# Patient Record
Sex: FEMALE | HISPANIC OR LATINO | Employment: FULL TIME | URBAN - NONMETROPOLITAN AREA
[De-identification: names, ages, dates, MRNs, and addresses within clinical notes are randomized per-mention and may not be internally consistent; named-entity substitution may affect disease eponyms.]

---

## 2023-05-31 ENCOUNTER — OFFICE VISIT (OUTPATIENT)
Dept: OBGYN CLINIC | Facility: CLINIC | Age: 43
End: 2023-05-31
Payer: OTHER MISCELLANEOUS

## 2023-05-31 VITALS
HEART RATE: 61 BPM | SYSTOLIC BLOOD PRESSURE: 138 MMHG | BODY MASS INDEX: 25.32 KG/M2 | DIASTOLIC BLOOD PRESSURE: 80 MMHG | TEMPERATURE: 97.5 F | HEIGHT: 60 IN | WEIGHT: 129 LBS

## 2023-05-31 DIAGNOSIS — G89.29 CHRONIC RIGHT SHOULDER PAIN: ICD-10-CM

## 2023-05-31 DIAGNOSIS — S46.911A STRAIN OF RIGHT SHOULDER, INITIAL ENCOUNTER: ICD-10-CM

## 2023-05-31 DIAGNOSIS — M25.511 CHRONIC RIGHT SHOULDER PAIN: ICD-10-CM

## 2023-05-31 DIAGNOSIS — M54.2 CERVICALGIA: ICD-10-CM

## 2023-05-31 DIAGNOSIS — M67.819 TENDINOSIS OF ROTATOR CUFF: ICD-10-CM

## 2023-05-31 DIAGNOSIS — M54.2 NECK PAIN: Primary | ICD-10-CM

## 2023-05-31 PROCEDURE — 99204 OFFICE O/P NEW MOD 45 MIN: CPT | Performed by: STUDENT IN AN ORGANIZED HEALTH CARE EDUCATION/TRAINING PROGRAM

## 2023-05-31 RX ORDER — ACETAMINOPHEN 325 MG/1
650 TABLET ORAL EVERY 6 HOURS PRN
COMMUNITY

## 2023-05-31 RX ORDER — LISINOPRIL 20 MG/1
20 TABLET ORAL DAILY
COMMUNITY

## 2023-05-31 NOTE — LETTER
May 31, 2023     Patient: Michelle Medina  YOB: 1980  Date of Visit: 5/31/2023      To Whom it May Concern:    Michelle Medina is under my professional care  Ana Raymundo was seen in my office on 5/31/2023  Patient to be scheduled for an ultrasound guided injection cortisone injection of her right shoulder as soon as possible  Patient recommended to continue formal physical therapy 2x/week to facilitate recovery and work hardening  Restricted to lifting/pushing/pulling no more than 10 pounds with her right arm until cleared by a physician  Plan on follow up in 1 week for ultrasound guided injection  If you have any questions or concerns, please don't hesitate to call           Sincerely,          Monie Cervantes MD        CC: No Recipients

## 2023-05-31 NOTE — PROGRESS NOTES
1  Neck pain  Ambulatory Referral to Physical Therapy      2  Cervicalgia  Ambulatory Referral to Physical Therapy      3  Chronic right shoulder pain  Ambulatory Referral to Physical Therapy      4  Tendinosis of rotator cuff  Ambulatory Referral to Physical Therapy      5  Strain of right shoulder, initial encounter  Ambulatory Referral to Physical Therapy        Orders Placed This Encounter   Procedures   • Ambulatory Referral to Physical Therapy        Imaging Studies (I personally reviewed images in PACS and report):    • MRI cervical spine with and without contrast 5/10/2023:  • MRI right shoulder without contrast 4/12/2023:                  • MRI cervical spine with and without contrast 10/28/2017: via [facility] (no images)  MR findings: There is straightening of the normal cervical lordosis, a stable finding  The vertebral bodies are normal in height and signal characteristics  Degenerative disc space narrowing is present at C4-5 through C6-7  The signal intensity within the cervical cord is normal and there is no pathologic cord enhancement  At C2-3, there is no significant abnormality  At C3-4, there is minor bulging of the anulus fibrosus  Left-sided uncovertebral spurring and facet arthropathy is present with moderate neural foraminal narrowing  At C4-5, there is mild bulging of the anulus fibrosus associated with the posterior annular fissure  This partially effaces the anterior CSF space  There is no significant central stenosis or neural foraminal stenosis  There is no cord compression  At C5-6, there is bulging of the anulus fibrosus with very slight flattening of the ventral cord  Uncovertebral spurring is present with minor neural foraminal narrowing  At C6-7, there is minor bulging of the anulus fibrosus which partially effaces the anterior CSF space  At C7-T1, there is no significant abnormality       The paraspinal soft tissues are "unremarkable  IMPRESSION:  • Chronic right shoulder   • Chronic neck pain/cervicalgia  • S/p work injury - reports pulling four carts of heavy totes and felt a sudden straining sensation and immediate pain with limited range of motion of her shoulder  • Pain originally was in the shoulder and is progressively radiated to the dorsal aspect of her shoulder and right paracervical aspect of her neck  • Reported paresthesias of her right hand as well but neurologically intact on clinical exam  However, she notes that her prior CSI which provided brief relief alleviate these symptoms as well which is an atypical response  • Reportedly briefly improved for about 4 days from a palpation guided \"shoulder injection\" about a month ago  Limited relief from attending formal PT ([facility])  • MRI noting tendinitis of right supraspinatus  MRI of c-spine noting mild disc bulging/spondylosis  Clinically aggravated when testing rotator cuff/biceps as per exam below    Date of Injury: 3/6/2023      Workmen's Compensation initial evaluation    Other factors:  • History of chronic headache disorder  • Cannot tolerate oral NSAIDs due to allergy    PLAN:    • Clinical exam and radiographic imaging reviewed with patient today, with impression as per above  I have discussed with the patient the pathophysiology of this diagnosis and reviewed how the examination correlates with this diagnosis  • Patient has MRIs of her cervical spine as well as her right shoulder uploaded today      • Given her reported brief/lack of response to a palpation guided CSI of her right shoulder and no surgical intervention warranted based on MRIs, plan for ultrasound-guided right subacromial cortisone injection as well as a bicipital tendon sheath cortisone injection based on her clinical exam today  • Recommended to continue formal physical therapy 2 times a week to help facilitate recovery and work hardening  • Based on the findings of her MRI, I would " "recommend continued conservative treatment at this time  If she does not respond to cortisone injections as well as continued physical therapy, may need to get a second opinion from orthopedic surgery as well as pain management  • Work accommodations note provided today as per communications  • Patient reports already using a TENS machine unit and I recommended continued use 3 times a day for 15-minute sessions  She can continue use of acetaminophen 500-1000 mg every 6-8 hours as needed  Patient is unable to tolerate NSAIDs due to an allergy  Return in about 1 week (around 6/7/2023) for Schedule USG injection of right shoulder (30 minutes)  Portions of the record may have been created with voice recognition software  Occasional wrong word or \"sound a like\" substitutions may have occurred due to the inherent limitations of voice recognition software  Read the chart carefully and recognize, using context, where substitutions have occurred  I have spent a total time of 45 minutes on 06/05/23 in caring for this patient including Diagnostic results, Prognosis, Risks and benefits of tx options, Instructions for management, Patient and family education, Importance of tx compliance, Risk factor reductions, Impressions, Counseling / Coordination of care, Documenting in the medical record, Reviewing / ordering tests, medicine, procedures   and Obtaining or reviewing history    CHIEF COMPLAINT:  Chief Complaint   Patient presents with   • Neck - Pain   Right shoulder pain      HPI:  Anuj Mahoney is a 37 y o  female  who presents for       Visit 5/31/2023:  Evaluation of neck pain/injury  Workmen's Compensation initial evaluation  Precipitating injury on 3/6/2023 - reports pulling four totes of material and feeling and sudden/straining sensation of her right shoulder and right neck  She also reports paresthesias of her right hand intermittently as well    Since incident, she reports treatments included are " formal PT with [facility] (cannot confirm visit on chart review today), a right shoulder injection (cannot confirm what was performed) about a month ago in which she states sustaining about 4-5 days of relief completely before pain slowly recurred; she states paresthesias of her hand also resolved during this time  She continues to be on work accommodations as she feels she cannot increase her demands without aggravating right shoulder pain  Describes pain as sharp/aching/throbbing and of moderate intensity  Pain aggravated when reaching above shoulder height/behind her back as well as lifting/pushing/pulling and lying on her right shoulder  In regards to pain control currently, she reports she cannot take NSAIDs due to an allergy  She has been using acetaminophens, ice/heat, TENS unit with minimal relief  Denies prior surgeries of right shoulder in the past         Medical, Surgical, Family, and Social History    Past Medical History:   Diagnosis Date   • Hypertension      History reviewed  No pertinent surgical history  Social History   Social History     Substance and Sexual Activity   Alcohol Use Never     Social History     Substance and Sexual Activity   Drug Use Never     Social History     Tobacco Use   Smoking Status Never   Smokeless Tobacco Never     History reviewed  No pertinent family history  Allergies   Allergen Reactions   • Corylus Itching   • Proanthocyanidin Swelling   • Shrimp Extract Allergy Skin Test - Food Allergy Hives   • Apple Allergy Skin Test - Food Allergy Facial Swelling   • Ibuprofen Swelling   • Penicillins Edema          Physical Exam  /80 (BP Location: Left arm)   Pulse 61   Temp 97 5 °F (36 4 °C) (Temporal)   Ht 5' (1 524 m)   Wt 58 5 kg (129 lb)   BMI 25 19 kg/m²     Constitutional:  see vital signs  Gen: well-developed, normocephalic/atraumatic, well-groomed  Pulmonary/Chest: Effort normal  No respiratory distress         Ortho Exam  Cervical  ROM: intact  Midline spinous process tenderness: None  Muscular Tenderness: +right paracervical  Sensation UE Bilateral:  C5: normal  C6: decreased on right compared to left  C7: decreased on right compared to left  C8: decreased on right compared to left  T1: normal  Strength UE: 5/5 elbow, wrist, fingers bilateral   Spurlings: negative b/l    OK sign: intact  Thumb extension: 5/5  Digit abduction: intact and symmetrical w/ contralateral side     Shoulder exam:       RIGHT    Inspection Erythema None     Swelling None     Increased Warmth None    Rotator cuff  ER 5-/5     IR 5/5     Abduction 5-/5    ROM      Abduction 130     IR90 40     IRb Lower thoracic    TTP:  +anterior aspect over bicipital groove, lateral aspect over greater tuberosity    Special Tests: O'Briens  (FF 90, add 10, resist thumbs up-, resist thumbs down+) Negative slap    Cross body Adduction Negative     Speeds  Positive     Yergason's Positive     Drop arm Negative     Neer Positive     Falcon Positive            Procedures

## 2023-07-12 ENCOUNTER — OFFICE VISIT (OUTPATIENT)
Dept: OBGYN CLINIC | Facility: CLINIC | Age: 43
End: 2023-07-12
Payer: OTHER MISCELLANEOUS

## 2023-07-12 VITALS
HEART RATE: 65 BPM | WEIGHT: 129 LBS | HEIGHT: 60 IN | TEMPERATURE: 97.7 F | DIASTOLIC BLOOD PRESSURE: 60 MMHG | BODY MASS INDEX: 25.32 KG/M2 | SYSTOLIC BLOOD PRESSURE: 100 MMHG

## 2023-07-12 DIAGNOSIS — M54.2 NECK PAIN: ICD-10-CM

## 2023-07-12 DIAGNOSIS — M67.819 TENDINOSIS OF ROTATOR CUFF: ICD-10-CM

## 2023-07-12 DIAGNOSIS — S46.911A STRAIN OF RIGHT SHOULDER, INITIAL ENCOUNTER: ICD-10-CM

## 2023-07-12 DIAGNOSIS — G89.29 CHRONIC RIGHT SHOULDER PAIN: Primary | ICD-10-CM

## 2023-07-12 DIAGNOSIS — M54.2 CERVICALGIA: ICD-10-CM

## 2023-07-12 DIAGNOSIS — M75.21 BICIPITAL TENDINITIS OF RIGHT SHOULDER: ICD-10-CM

## 2023-07-12 DIAGNOSIS — M25.511 CHRONIC RIGHT SHOULDER PAIN: Primary | ICD-10-CM

## 2023-07-12 PROCEDURE — 76942 ECHO GUIDE FOR BIOPSY: CPT | Performed by: STUDENT IN AN ORGANIZED HEALTH CARE EDUCATION/TRAINING PROGRAM

## 2023-07-12 PROCEDURE — 20611 DRAIN/INJ JOINT/BURSA W/US: CPT | Performed by: STUDENT IN AN ORGANIZED HEALTH CARE EDUCATION/TRAINING PROGRAM

## 2023-07-12 PROCEDURE — 20550 NJX 1 TENDON SHEATH/LIGAMENT: CPT | Performed by: STUDENT IN AN ORGANIZED HEALTH CARE EDUCATION/TRAINING PROGRAM

## 2023-07-12 PROCEDURE — S0020 INJECTION, BUPIVICAINE HYDRO: HCPCS | Performed by: STUDENT IN AN ORGANIZED HEALTH CARE EDUCATION/TRAINING PROGRAM

## 2023-07-12 RX ORDER — BUPIVACAINE HYDROCHLORIDE 2.5 MG/ML
2 INJECTION, SOLUTION INFILTRATION; PERINEURAL
Status: COMPLETED | OUTPATIENT
Start: 2023-07-12 | End: 2023-07-12

## 2023-07-12 RX ORDER — BETAMETHASONE SODIUM PHOSPHATE AND BETAMETHASONE ACETATE 3; 3 MG/ML; MG/ML
3 INJECTION, SUSPENSION INTRA-ARTICULAR; INTRALESIONAL; INTRAMUSCULAR; SOFT TISSUE
Status: COMPLETED | OUTPATIENT
Start: 2023-07-12 | End: 2023-07-12

## 2023-07-12 RX ORDER — BUPIVACAINE HYDROCHLORIDE 2.5 MG/ML
0.5 INJECTION, SOLUTION INFILTRATION; PERINEURAL
Status: COMPLETED | OUTPATIENT
Start: 2023-07-12 | End: 2023-07-12

## 2023-07-12 RX ORDER — TRIAMCINOLONE ACETONIDE 40 MG/ML
40 INJECTION, SUSPENSION INTRA-ARTICULAR; INTRAMUSCULAR
Status: COMPLETED | OUTPATIENT
Start: 2023-07-12 | End: 2023-07-12

## 2023-07-12 RX ADMIN — BUPIVACAINE HYDROCHLORIDE 2 ML: 2.5 INJECTION, SOLUTION INFILTRATION; PERINEURAL at 12:15

## 2023-07-12 RX ADMIN — BUPIVACAINE HYDROCHLORIDE 0.5 ML: 2.5 INJECTION, SOLUTION INFILTRATION; PERINEURAL at 12:15

## 2023-07-12 RX ADMIN — TRIAMCINOLONE ACETONIDE 40 MG: 40 INJECTION, SUSPENSION INTRA-ARTICULAR; INTRAMUSCULAR at 12:15

## 2023-07-12 RX ADMIN — BETAMETHASONE SODIUM PHOSPHATE AND BETAMETHASONE ACETATE 3 MG: 3; 3 INJECTION, SUSPENSION INTRA-ARTICULAR; INTRALESIONAL; INTRAMUSCULAR; SOFT TISSUE at 12:15

## 2023-07-12 NOTE — PROGRESS NOTES
Hand/upper extremity injection  Universal Protocol:  Consent: Verbal consent not obtained. Risks and benefits: risks, benefits and alternatives were discussed  Consent given by: patient  Patient understanding: patient states understanding of the procedure being performed  Site marked: the operative site was marked  Radiology Images displayed and confirmed. If images not available, report reviewed: imaging studies available  Required items: required blood products, implants, devices, and special equipment available  Patient identity confirmed: verbally with patient    Supporting Documentation  Indications: joint swelling, pain, diagnostic and therapeutic   Procedure Details  Condition:tendonitis Condition comment: Right bicipital tendonitis   Preparation: Patient was prepped and draped in the usual sterile fashion  Needle size: 25 G  Ultrasound guidance: yes  Approach: lateral  Medications administered: 3 mg betamethasone acetate-betamethasone sodium phosphate 6 (3-3) mg/mL; 0.5 mL bupivacaine 0.25 %  Analysis: fluid sample sent for laboratory analysis  Patient tolerance: patient tolerated the procedure well with no immediate complications    RIGHT Ultrasound-guided long head of the biceps tendon injection performed with sterile field, gloves, probe cover using anesthetic and corticosteroid with 25 gauge needle advanced to target site in line with linear array probe short axis to biceps tendon in bicipital groove. Anterior circumflex artery visualized and avoided during injection. Injectate visualized filling tendinous sheath. Sterile bandage placed. Minimal to no bleeding. Post-procedure, counseled no lifting more than 1 pound with her right arm for the next 3 days in order to minimize risk for bicipital tendon rupture. After 3 days, she can return to her restricted lifting duties with her right arm. Work accommodations note provided today.   I also placed a new referral to physical therapy to restart post injections. I will follow-up with her in 6 weeks for reevaluation. Large joint arthrocentesis: R subacromial bursa  Universal Protocol:  Consent: Verbal consent obtained. Risks and benefits: risks, benefits and alternatives were discussed  Consent given by: patient  Patient understanding: patient states understanding of the procedure being performed  Site marked: the operative site was marked  Radiology Images displayed and confirmed.  If images not available, report reviewed: imaging studies available  Required items: required blood products, implants, devices, and special equipment available  Patient identity confirmed: verbally with patient    Supporting Documentation  Indications: pain and diagnostic evaluation   Procedure Details  Location: shoulder - R subacromial bursa  Preparation: Patient was prepped and draped in the usual sterile fashion  Needle size: 22 G  Ultrasound guidance: yes  Approach: posterior  Medications administered: 40 mg triamcinolone acetonide 40 mg/mL; 2 mL bupivacaine 0.25 %    Patient tolerance: patient tolerated the procedure well with no immediate complications  Dressing:  Sterile dressing applied

## 2023-07-12 NOTE — LETTER
July 12, 2023     Patient: Papo Garay  YOB: 1980  Date of Visit: 7/12/2023      To Whom it May Concern:    Papo Mendesnt is under my professional care. Federico Elizabeth was seen in my office on 7/12/2023. Patient underwent ultrasound guided injections of her right shoulder today. Recommend no lifting/pushing/pulling more than 1 pound with right arm for three days. On 7/16/2023, she is restricted from lifting/pushing/pulling no more than 5 pounds with her right arm until cleared by a physician. Patient recommended to continue formal physical therapy 2x/week to facilitate recovery and work hardening for the next 6 weeks. Follow up in 6 weeks. If you have any questions or concerns, please don't hesitate to call.          Sincerely,          Janay Cole MD        CC: No Recipients

## 2023-07-12 NOTE — PATIENT INSTRUCTIONS
CORTICOSTEROID INJECTION  What is a corticosteroid? Injuries or disease such as arthritis, bursitis or tendonitis result in inflammation. In turn, this inflammation can cause swelling and pain. A local injection of a corticosteroid is provided to diminish inflammation. By doing so, it will also decrease pain and swelling which is making you uncomfortable. Is this the same thing as a Cortisone Injection? Cortisone® is a brand name of a corticosteroid used commonly in the past.  Today I commonly use a more water-soluble corticosteroid named Celestone®. Will the injection hurt? As with any injection, you may feel pain at the time of the injection. Typically, I use a local anesthetic (Anneliese Freda) in addition to the corticosteroid to determine if the injection has been placed in the appropriate location. Hence it is important to monitor your symptoms 4-6 hours after the injection, as the area will be anesthetized (numb) while the local anesthetic is working. Once the local anesthetic wears off, the intensity of pain can be the same as it was prior to the injection, or even worse. This does not mean that the injection is not working. The corticosteroid may take 24-72 hours to begin having a positive effect. If you do experience an increase in pain, the use of an ice pack on the area for 20 minutes at a time should help. It is also helpful to take an oral anti-inflammatory such as Tylenol® or Motrin® if you are able to medically do so. For this reason it is best to avoid activities that put stress on the area the first 24 hours after the injection. How long will pain relief last?  This will vary according to the type and severity of the symptoms being treated and the severity of the condition. Symptom relief may last weeks to months. I typically couple injections with physical therapy so that the underlying problem causing the inflammation may be treated as the pain diminishes.   If the combination is not successful, you may be a surgical candidate. I have read bad things about steroids. Will these things happen to me? Corticosteroids, when utilized properly, are safe and effective drugs. When used in a low dose, potential adverse reactions are very rare. Some patients may experience a sensation of flushing for several days. Some can experience feelings of agitation. Others may have depigmentation and dimpling of the overlying skin at the site of the injection. Very rarely, there can be a local reaction which may include increased discomfort for a period of time in the areas that has been injected. A steroid should not be used over and over again. Multiple injections in the same area can produce adverse effects such as tissue atrophy and degeneration of tendon or cartilage. A small percentage of patients (< 0.1%) may develop an infection in the joint after injection. This is a treatable problem, but if neglected, may result in permanent disability. Signs of infection include redness, swelling, discharge, fevers/chills, increasing pain and drainage from the injection site. This represents an emergency and you should contact our office immediately or seek treatment in the ER if after hours. If I have diabetes, will this injection affect me? If you are diabetic, an injection of a corticosteroid can raise your blood sugar level, requiring more insulin for a brief period of time. This may necessitate careful blood sugar maintenance. If the elevated sugars are not able to be controlled, contact your diabetic doctor for guidance.

## 2023-08-23 ENCOUNTER — OFFICE VISIT (OUTPATIENT)
Dept: OBGYN CLINIC | Facility: CLINIC | Age: 43
End: 2023-08-23
Payer: OTHER MISCELLANEOUS

## 2023-08-23 VITALS
HEIGHT: 60 IN | OXYGEN SATURATION: 97 % | WEIGHT: 127.8 LBS | HEART RATE: 71 BPM | SYSTOLIC BLOOD PRESSURE: 110 MMHG | DIASTOLIC BLOOD PRESSURE: 70 MMHG | BODY MASS INDEX: 25.09 KG/M2 | TEMPERATURE: 98 F

## 2023-08-23 DIAGNOSIS — S46.911D STRAIN OF RIGHT SHOULDER, SUBSEQUENT ENCOUNTER: ICD-10-CM

## 2023-08-23 DIAGNOSIS — M75.21 BICIPITAL TENDINITIS OF RIGHT SHOULDER: ICD-10-CM

## 2023-08-23 DIAGNOSIS — M54.2 NECK PAIN: ICD-10-CM

## 2023-08-23 DIAGNOSIS — G89.29 CHRONIC RIGHT SHOULDER PAIN: Primary | ICD-10-CM

## 2023-08-23 DIAGNOSIS — M67.819 TENDINOSIS OF ROTATOR CUFF: ICD-10-CM

## 2023-08-23 DIAGNOSIS — M54.2 CERVICALGIA: ICD-10-CM

## 2023-08-23 DIAGNOSIS — M54.12 CERVICAL RADICULAR PAIN: ICD-10-CM

## 2023-08-23 DIAGNOSIS — M25.511 CHRONIC RIGHT SHOULDER PAIN: Primary | ICD-10-CM

## 2023-08-23 PROCEDURE — 99213 OFFICE O/P EST LOW 20 MIN: CPT | Performed by: STUDENT IN AN ORGANIZED HEALTH CARE EDUCATION/TRAINING PROGRAM

## 2023-08-23 NOTE — LETTER
August 23, 2023     Patient: Johnny Hung  YOB: 1980  Date of Visit: 8/23/2023      To Whom it May Concern:    Johnny Hung is under my professional care. Shamika Mari was seen in my office on 8/23/2023. Patient reports no improvement of symptoms. However, she notes that she has not restarted physical therapy since our last appointment which can limit recovery. I have placed a new physical therapy referral and I recommend she continue 2x/week. Restricted to lifting/pushing/pulling no more than 10 pounds with her right arm until cleared by a physician. Restricted from repetitive motions with right upper extremity. She will be additionally referred today to orthopedic surgery and also spine & pain management for further second opinions on treatment options. If you have any questions or concerns, please don't hesitate to call.          Sincerely,          Monet Allison MD        CC: No Recipients

## 2023-08-23 NOTE — PROGRESS NOTES
1. Chronic right shoulder pain  Ambulatory Referral to Physical Therapy    Ambulatory Referral to Orthopedic Surgery      2. Tendinosis of rotator cuff  Ambulatory Referral to Physical Therapy    Ambulatory Referral to Orthopedic Surgery      3. Strain of right shoulder, subsequent encounter  Ambulatory Referral to Physical Therapy      4. Bicipital tendinitis of right shoulder  Ambulatory Referral to Physical Therapy      5. Cervicalgia  Ambulatory Referral to Physical Therapy    Ambulatory referral to Spine & Pain Management      6. Neck pain  Ambulatory Referral to Physical Therapy    Ambulatory referral to Spine & Pain Management      7. Cervical radicular pain  Ambulatory referral to Spine & Pain Management        Orders Placed This Encounter   Procedures   • Ambulatory Referral to Physical Therapy   • Ambulatory Referral to Orthopedic Surgery   • Ambulatory referral to Spine & Pain Management        Imaging Studies (I personally reviewed images in PACS and report):    • MRI cervical spine with and without contrast 5/10/2023:  • MRI right shoulder without contrast 4/12/2023:                  • MRI cervical spine with and without contrast 10/28/2017: via [employer] (no images)  MR findings: There is straightening of the normal cervical lordosis, a stable finding. The vertebral bodies are normal in height and signal characteristics. Degenerative disc space narrowing is present at C4-5 through C6-7. The signal intensity within the cervical cord is normal and there is no pathologic cord enhancement. At C2-3, there is no significant abnormality. At C3-4, there is minor bulging of the anulus fibrosus. Left-sided uncovertebral spurring and facet arthropathy is present with moderate neural foraminal narrowing. At C4-5, there is mild bulging of the anulus fibrosus associated with the posterior annular fissure. This partially effaces the anterior CSF space.  There is no significant central stenosis or neural foraminal stenosis. There is no cord compression. At C5-6, there is bulging of the anulus fibrosus with very slight flattening of the ventral cord. Uncovertebral spurring is present with minor neural foraminal narrowing. At C6-7, there is minor bulging of the anulus fibrosus which partially effaces the anterior CSF space. At C7-T1, there is no significant abnormality. The paraspinal soft tissues are unremarkable. IMPRESSION:  • Chronic right shoulder   • Chronic neck pain/cervicalgia  • S/p work injury - reports pulling four carts of heavy totes and felt a sudden straining sensation and immediate pain with limited range of motion of her shoulder  • Pain originally was in the shoulder and is progressively radiated to the dorsal aspect of her shoulder and right paracervical aspect of her neck  • Reported paresthesias of her right hand as well but neurologically intact on clinical exam. However, she notes that her prior CSI which provided brief relief alleviate these symptoms as well which is an atypical response  • Reportedly briefly improved for about 4 days from a palpation guided "shoulder injection" about a month ago. Limited relief from attending formal PT ([facility])  • MRI noting tendinitis of right supraspinatus. MRI of c-spine noting mild disc bulging/spondylosis. Clinically aggravated when testing rotator cuff/biceps as per exam below  • S/p USG right subacromial CSI and bicipital tendon sheath CSI on 7/12/2023 -patient reports minimal to no improvement of her pain since last visit. However she reportedly did not restart physical therapy after our last appointment    Date of Injury: 3/6/2023      Workmen's Compensation initial evaluation    Other factors:  • History of chronic headache disorder  • Cannot tolerate oral NSAIDs due to allergy    PLAN:    • Clinical exam and radiographic imaging reviewed with patient today, with impression as per above.  I have discussed with the patient the pathophysiology of this diagnosis and reviewed how the examination correlates with this diagnosis. • Given she has not had any significant improvement since her ultrasound-guided injections, I have referred her to orthopedic surgery as well as pain management for second opinions. • I have placed a new order for physical therapy going forward to help facilitate recovery as well as work hardening  • I have placed a new work note today with accommodations. • Patient to continue as needed use of acetaminophen 500-1000 mg Q 6-8 hours as needed, heat/ice therapy torments on/off, TENS machine use as needed for pain control. • I can follow-up with patient after her second opinion. If no surgical intervention is recommended and pain management is no other recommended treatment modalities, I may need to consider referring patient for a functional capacity exam.    Return for Follow up with pain management and orthopedic surgery for second opinions. Portions of the record may have been created with voice recognition software. Occasional wrong word or "sound a like" substitutions may have occurred due to the inherent limitations of voice recognition software. Read the chart carefully and recognize, using context, where substitutions have occurred. CHIEF COMPLAINT:  Chief Complaint   Patient presents with   • Right Shoulder - Pain   Neck pain      HPI:  Kota Cavazos is a 37 y.o. female  who presents for     Visit 8/23/2023: Follow-up evaluation of neck pain/right shoulder pain/injury  Workmen's Compensation follow-up evaluation  Patient last seen previously regards to having an ultrasound-guided subacromial cortisone injection as well as a bicipital tendon sheath cortisone injection-patient reports that she briefly had minimal improvement of the intensity of pain of her right shoulder before progressively recurred again.   She was also recommended to continue formal physical therapy to work on facilitating recovery/work hardening. I have not been receiving any notes from physical therapy -patient states that she has not restarted physical therapy at [facility] since her last appointment. Continues report pain mainly over the anterior and lateral aspects of her shoulder that can radiate up to her neck as well as down to her right hand. She describes as a sharp/shooting pain that is aggravated with any repetitive range of motion movements as well as reaching above shoulder height and behind her back. She reports a sense of fatigue and weakness of her right arm compared to her contralateral side. Denies shoulder swelling. Reports intermittent crepitus of her shoulder with range of motion movements. Denies any new injury since last visit. 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 Visit 5/31/2023:  Evaluation of neck pain/injury  Workmen's Compensation initial evaluation  Precipitating injury on 3/6/2023 - reports pulling four totes of material and feeling and sudden/straining sensation of her right shoulder and right neck. She also reports paresthesias of her right hand intermittently as well. Since incident, she reports treatments included are formal PT with [facility] (cannot confirm visit on chart review today), a right shoulder injection (cannot confirm what was performed) about a month ago in which she states sustaining about 4-5 days of relief completely before pain slowly recurred; she states paresthesias of her hand also resolved during this time. She continues to be on work accommodations as she feels she cannot increase her demands without aggravating right shoulder pain  Describes pain as sharp/aching/throbbing and of moderate intensity.  Pain aggravated when reaching above shoulder height/behind her back as well as lifting/pushing/pulling and lying on her right shoulder  In regards to pain control currently, she reports she cannot take NSAIDs due to an allergy. She has been using acetaminophens, ice/heat, TENS unit with minimal relief. Denies prior surgeries of right shoulder in the past.        Medical, Surgical, Family, and Social History    Past Medical History:   Diagnosis Date   • Hypertension      Past Surgical History:   Procedure Laterality Date   • LAPAROSCOPY      endometriosis     Social History   Social History     Substance and Sexual Activity   Alcohol Use Never     Social History     Substance and Sexual Activity   Drug Use Never     Social History     Tobacco Use   Smoking Status Never   Smokeless Tobacco Never     History reviewed. No pertinent family history. Allergies   Allergen Reactions   • Corylus Itching   • Proanthocyanidin Swelling   • Shrimp Extract Allergy Skin Test - Food Allergy Hives   • Apple Allergy Skin Test - Food Allergy Facial Swelling   • Ibuprofen Swelling   • Penicillins Edema          Physical Exam  /70   Pulse 71   Temp 98 °F (36.7 °C)   Ht 5' (1.524 m)   Wt 58 kg (127 lb 12.8 oz)   SpO2 97%   BMI 24.96 kg/m²     Constitutional:  see vital signs  Gen: well-developed, normocephalic/atraumatic, well-groomed  Pulmonary/Chest: Effort normal. No respiratory distress.        Ortho Exam  Cervical  ROM: intact in all planes of motion but patient reports aggravated midline paracervical neck pain while performing  Midline spinous process tenderness: C5, C6, C7+  Muscular Tenderness: +right paracervical  Sensation UE Bilateral:  C5: normal  C6: Normal  C7: Normal  C8: normal   T1: normal  Strength UE: 5/5 elbow, wrist, fingers bilateral   Spurlings: negative b/l    OK sign: intact  Thumb extension: 5/5  Digit abduction: intact and symmetrical w/ contralateral side     Shoulder exam:       RIGHT    Inspection Erythema None     Swelling None     Increased Warmth None Rotator cuff  ER 5/5     IR 5/5     Abduction 5/5    ROM      Abduction 90 actively, 140 passively     IR90 40     IRb Lower thoracic    TTP:  + Anteriorly over glenohumeral joint, laterally over the greater tuberosity, right paracervical/trapezius    Special Tests: O'Briens  (FF 90, add 10, resist thumbs up-, resist thumbs down+) Negative slap    Cross body Adduction Negative     Speeds  Positive     Yergason's Positive     Drop arm Negative     Neer Positive     Falcon Positive            Procedures

## 2023-09-14 ENCOUNTER — TELEPHONE (OUTPATIENT)
Dept: OBGYN CLINIC | Facility: CLINIC | Age: 43
End: 2023-09-14

## 2023-09-14 NOTE — TELEPHONE ENCOUNTER
----- Message from Stefania Jiménez MD sent at 9/14/2023  9:39 AM EDT -----  Awa Recinos,    I updated her work letter in her communications with the dates of her ortho/pain mgmt follow ups. I did not make a specific expiration date as she has not seen orthopedic surgery or pain management yet. I have also not received any documents from physical therapy that she reportedly has been attending; can you see where she went to reach out to their office and have them fax over how long she has been seeing them (I think she said [facility]).       ----- Message -----  From: Malachi Howell  Sent: 9/14/2023   9:11 AM EDT  To: Stefania Jiménez MD    Patient stopped by office this morning asking for new work note. Has appointments set up for second opinion 9/27/23 with ortho. But needs a new work for the time being. Note needs an expiration date of treatment saying that treatment is not over? Employer is giving a hard time since there was no specific date on original note from the last office visit on 8/23/23. Would like a callback.      Callback #: [phone]
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
## 2023-09-21 ENCOUNTER — EVALUATION (OUTPATIENT)
Age: 43
End: 2023-09-21
Payer: OTHER MISCELLANEOUS

## 2023-09-21 DIAGNOSIS — M67.819 TENDINOSIS OF ROTATOR CUFF: ICD-10-CM

## 2023-09-21 DIAGNOSIS — S46.911D STRAIN OF RIGHT SHOULDER, SUBSEQUENT ENCOUNTER: ICD-10-CM

## 2023-09-21 DIAGNOSIS — G89.29 CHRONIC RIGHT SHOULDER PAIN: ICD-10-CM

## 2023-09-21 DIAGNOSIS — M54.2 CERVICALGIA: ICD-10-CM

## 2023-09-21 DIAGNOSIS — M75.21 BICIPITAL TENDINITIS OF RIGHT SHOULDER: ICD-10-CM

## 2023-09-21 DIAGNOSIS — M54.2 NECK PAIN: ICD-10-CM

## 2023-09-21 DIAGNOSIS — M25.511 CHRONIC RIGHT SHOULDER PAIN: ICD-10-CM

## 2023-09-21 PROCEDURE — 97161 PT EVAL LOW COMPLEX 20 MIN: CPT | Performed by: PHYSICAL THERAPIST

## 2023-09-21 PROCEDURE — 97110 THERAPEUTIC EXERCISES: CPT | Performed by: PHYSICAL THERAPIST

## 2023-09-21 NOTE — PROGRESS NOTES
PT Evaluation     Today's date: 2023  Patient name: Tricia Guzman  : 1980  MRN: [id number]  Referring provider: Quin Parry MD  Dx:   Encounter Diagnosis     ICD-10-CM    1. Chronic right shoulder pain  M25.511 Ambulatory Referral to Physical Therapy    G89.29       2. Tendinosis of rotator cuff  M67.819 Ambulatory Referral to Physical Therapy      3. Strain of right shoulder, subsequent encounter  S46.911D Ambulatory Referral to Physical Therapy      4. Bicipital tendinitis of right shoulder  M75.21 Ambulatory Referral to Physical Therapy      5. Cervicalgia  M54.2 Ambulatory Referral to Physical Therapy      6. Neck pain  M54.2 Ambulatory Referral to Physical Therapy          Start Time: 1050  Stop Time: 1145  Total time in clinic (min): 55 minutes    Assessment  Assessment details: Tricia Guzman presents to PT with 6mth Hx of R shoulder pain that started when pulling a crate at work. Significant findings from examination include: decreased/painful Shoulder ROM, Painful/weak MMT, parascapular mm spasm, (+) shoulder impingement, and shoulder guarding. These findings are consistent with shoulder tendinopathy and ELIOT, limiting their reach and carry ability. Pt would benefit from course of PT to resolve the above mentioned impairments and facilitate return to PLOF. Impairments: abnormal muscle tone, abnormal or restricted ROM, impaired physical strength, lacks appropriate home exercise program, pain with function and scapular dyskinesis    Symptom irritability: moderateUnderstanding of Dx/Px/POC: good   Prognosis: good    Goals  Short Term Functional Goals:   In 4 weeks patient will:   Decrease R shoulder pain to 4 on a scale of 0-10 to allow turning head and performing ADL.    Increase AROM of R shoulder 50% improved symmetry compared to other side for performance of reach/carry tasks.   Patient will demonstrate 50% independence and compliance with HEP to maximize improvements in functional mobility.   Patient will demonstrate independence with proper posture, body mechanics, self pain management, and ADL modification.   pt will increase FOTO score by 10pts to reflect a statistically significant improvement.       Long Term Functional Goals (Goals for patient at discharge):    In 6 weeks patient will:   Decrease R shoulder pain to 2 on a scale of 0-10 to allow RTW s limitations.   Increase AROM of R shoulder to symmetrical c other shoulder for performance of reach/carry tasks.   pt will score at or above predicted discharge FOTO score of 56 to reflect improvement in subjective functional ability. Plan  Patient would benefit from: skilled physical therapy  Referral necessary: No  Planned modality interventions: cryotherapy and thermotherapy: hydrocollator packs  Planned therapy interventions: manual therapy, neuromuscular re-education, patient education, therapeutic activities, therapeutic exercise and home exercise program  Frequency: 2x week  Duration in weeks: 6  Plan of Care beginning date: 9/21/2023  Plan of Care expiration date: 11/2/2023  Treatment plan discussed with: patient        Subjective Evaluation    History of Present Illness  Date of onset: 3/6/2023  Mechanism of injury: Mehran Valencia is a 37 y.o. y/o female who reports 6mth Hx of R shoulder pain which started while pulling heavy totes at work. Initially felt "weird" and increased intensity within a few hours. CC is Difficulty lifting and reaching c RUE. Symptoms aggravated by reaching, lifting, and laying on R. Symptoms not improved by anything consistently. Has received 2 injections in shoulder c benefit lasting only 1-2 weeks each. 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   Symptoms change throughout the day: No   Relevant Surgical Hx: none  Pacemaker:  No   Cancer:  No             Not a recurrent problem   Quality of life: good    Patient Goals  Patient goals for therapy: decreased pain, increased motion, independence with ADLs/IADLs, increased strength and return to work    Pain  Current pain ratin  At best pain ratin  At worst pain ratin  Quality: dull ache, radiating, tight and sharp  Relieving factors: ice and medications  Aggravating factors: overhead activity and lifting  Progression: no change    Social Support    Employment status: working (modified duty)  Hand dominance: right    Treatments  Previous treatment: injection treatment        Objective     Palpation     Right   Muscle spasm in the rhomboids and upper trapezius. Tenderness     Right Shoulder  Tenderness in the biceps tendon (proximal), medial scapula and supraspinatus tendon. Active Range of Motion   Cervical/Thoracic Spine       Cervical    Flexion: 40 degrees   Extension: 35 degrees     with pain  Left lateral flexion: 30 degrees      Right lateral flexion: 30 degrees      Left rotation: 55 degrees with pain  Right rotation: 55 degrees         Left Shoulder   Flexion: 160 degrees   Abduction: 165 degrees   External rotation BTH: T4   Internal rotation BTB: T8     Right Shoulder   Flexion: 140 degrees with pain  Abduction: 105 degrees with pain  External rotation BTH: T1   Internal rotation BTB: L2     Passive Range of Motion     Right Shoulder   Flexion: 145 degrees   Abduction: 115 degrees   External rotation 90°: 80 degrees   Internal rotation 90°: 20 degrees     Strength/Myotome Testing     Left Shoulder   Normal muscle strength    Right Shoulder     Planes of Motion   Flexion: 3   Abduction: 3   External rotation at 90°: 3-   Internal rotation at 90°: 3-     Tests     Right Shoulder   Positive active compression ([place]), Hawkin's, horn blower, painful arc and bicep load . Negative passive horizontal adduction.               Precautions: none     Daily Treatment Diary:      Initial Evaluation Date: 23  Compliance                      Visit Number 1                    Re-Eval  IE Stanley Brown Captured y                           9/21                     Manual                                                                                        Ther-Ex                      Thoracic Ext 3x10                     Thoracic ROT x10 (B)                     5-way shoulder ISO 5x5" ea                     (B) Banded ER x10 5"                                                                                                                                   Edu  5"                     Neuro Re-Ed                                                                                                Ther-Act                                                               Modalities

## 2023-09-26 ENCOUNTER — OFFICE VISIT (OUTPATIENT)
Age: 43
End: 2023-09-26
Payer: OTHER MISCELLANEOUS

## 2023-09-26 DIAGNOSIS — M25.511 CHRONIC RIGHT SHOULDER PAIN: ICD-10-CM

## 2023-09-26 DIAGNOSIS — M75.21 BICIPITAL TENDINITIS OF RIGHT SHOULDER: ICD-10-CM

## 2023-09-26 DIAGNOSIS — S46.911D STRAIN OF RIGHT SHOULDER, SUBSEQUENT ENCOUNTER: ICD-10-CM

## 2023-09-26 DIAGNOSIS — M54.2 CERVICALGIA: Primary | ICD-10-CM

## 2023-09-26 DIAGNOSIS — M54.2 NECK PAIN: ICD-10-CM

## 2023-09-26 DIAGNOSIS — M67.819 TENDINOSIS OF ROTATOR CUFF: ICD-10-CM

## 2023-09-26 DIAGNOSIS — G89.29 CHRONIC RIGHT SHOULDER PAIN: ICD-10-CM

## 2023-09-26 PROCEDURE — 97112 NEUROMUSCULAR REEDUCATION: CPT | Performed by: PHYSICAL THERAPIST

## 2023-09-26 PROCEDURE — 97110 THERAPEUTIC EXERCISES: CPT | Performed by: PHYSICAL THERAPIST

## 2023-09-26 PROCEDURE — 97140 MANUAL THERAPY 1/> REGIONS: CPT | Performed by: PHYSICAL THERAPIST

## 2023-09-26 NOTE — PROGRESS NOTES
Daily Note     Today's date: 2023  Patient name: Elijah Reeder  : 1980  MRN: [id number]  Referring provider: Rubina Cantu MD  Dx:   Encounter Diagnosis     ICD-10-CM    1. Cervicalgia  M54.2       2. Neck pain  M54.2       3. Bicipital tendinitis of right shoulder  M75.21       4. Strain of right shoulder, subsequent encounter  S46.911D       5. Tendinosis of rotator cuff  M67.819       6. Chronic right shoulder pain  M25.511     G89.29           Start Time: 845  Stop Time: 935  Total time in clinic (min): 50 minutes    Subjective: pt notes no sig change from IE. Still stiff and tender along medial scapula. Objective: See treatment diary below      Assessment: Good initial response to POC and MT. Has ST restricted motion and R pec tightness contributing to decreased UE motion. Scap mobility improved following MT and reinforced by application of KT tape for postural awareness. Postural mm activation exercises expanded per flowsheet below. Plan: Continue per plan of care. Progress treatment as tolerated.        Precautions: none     Daily Treatment Diary:      Initial Evaluation Date: 23  Compliance                    Visit Number 1 2                   Re-Eval  IE                 MC   Foto Captured y                                              Manual                      Scap glide/distraction   8'                   CTJ HVLAT   5'                   K Tape Lower Trap   5'                   Ther-Ex                      Thoracic Ext 3x10  3x10                   Thoracic ROT x10 (B)  x15 (B)                   5-way shoulder ISO 5x5" ea  5x5" ea                   (B) Banded ER x10 5"  NR                   Pec minor stretch c 1/2 roll   4'                   Shoulder flexion c cane  x20                                                                                     Edu  5"                     Neuro Re-Ed                      (B) Banded ER   Grn 2x10 5" Prone Scap Ret  10x10"                                                  Ther-Act                                                               Modalities

## 2023-09-27 ENCOUNTER — TELEPHONE (OUTPATIENT)
Age: 43
End: 2023-09-27

## 2023-09-27 ENCOUNTER — OFFICE VISIT (OUTPATIENT)
Dept: OBGYN CLINIC | Facility: CLINIC | Age: 43
End: 2023-09-27
Payer: OTHER MISCELLANEOUS

## 2023-09-27 VITALS
BODY MASS INDEX: 25.32 KG/M2 | HEIGHT: 60 IN | WEIGHT: 129 LBS | DIASTOLIC BLOOD PRESSURE: 70 MMHG | HEART RATE: 78 BPM | SYSTOLIC BLOOD PRESSURE: 115 MMHG | TEMPERATURE: 98 F

## 2023-09-27 DIAGNOSIS — M67.819 TENDINOSIS OF ROTATOR CUFF: ICD-10-CM

## 2023-09-27 DIAGNOSIS — G89.29 CHRONIC RIGHT SHOULDER PAIN: ICD-10-CM

## 2023-09-27 DIAGNOSIS — M25.511 CHRONIC RIGHT SHOULDER PAIN: ICD-10-CM

## 2023-09-27 PROCEDURE — 99214 OFFICE O/P EST MOD 30 MIN: CPT | Performed by: ORTHOPAEDIC SURGERY

## 2023-09-27 NOTE — PROGRESS NOTES
ASSESSMENT/PLAN:    Diagnoses and all orders for this visit:    Chronic right shoulder pain  -     Ambulatory Referral to Orthopedic Surgery  -     Ambulatory Referral to Physical Therapy; Future    Tendinosis of rotator cuff  -     Ambulatory Referral to Orthopedic Surgery  -     Ambulatory Referral to Physical Therapy; Future        Reviewed physical exam and MRI with patient at time of visit. MRI findings demonstrate a tendinosis of the supraspinatus tendon with possible partial thickness tear. Discussed surgical intervention versus conservative treatment with patient time of visit. At this time, the patient elects to proceed with non-operative treatment. A referral was placed for physical therapy, focus on range of motion and strengthening. A note was provided for work with limitations including right handed work to shoulder height only and no lifting greater than 10lbs. The patient will follow-up in 6 weeks for re-evaluation. The patient expresses understanding and is in agreement with today's treatment plan. Return in about 6 weeks (around 11/8/2023) for Recheck.      _____________________________________________________  CHIEF COMPLAINT:  Chief Complaint   Patient presents with   • Right Shoulder - Pain         SUBJECTIVE:  Noel Collins is a 37y.o. year old female who presents for initial evaluation of her right shoulder/ The patient is right hand dominant. She states that she was attempting to pull 5 25lb totes while at work at [employer] approximately 6 months ago. As she pulled them forward, she experienced a pop in her shoulder. She reports continued pain in her shoulder, that has worsened since the initial injury. Her pain is exacerbated with rotation and overhead motion. She reports occasional radicular pain into her hand. She was previously evaluated by [facility] where she completed PT for approximately 3 months and was prescribed prednisone.  She was then discharged from PT and saw Dr. Ree Christian on 5/31/23. She received 2 CS injections and restarted PT on 9/21/23. She states that the pain continues to get worse. She has continued to work in a limited duty capacity since the injury. PAST MEDICAL HISTORY:  Past Medical History:   Diagnosis Date   • Hypertension        PAST SURGICAL HISTORY:  Past Surgical History:   Procedure Laterality Date   • LAPAROSCOPY      endometriosis       FAMILY HISTORY:  History reviewed. No pertinent family history. SOCIAL HISTORY:  Social History     Tobacco Use   • Smoking status: Never   • Smokeless tobacco: Never   Vaping Use   • Vaping Use: Never used   Substance Use Topics   • Alcohol use: Never   • Drug use: Never       MEDICATIONS:    Current Outpatient Medications:   •  acetaminophen (TYLENOL) 325 mg tablet, Take 650 mg by mouth every 6 (six) hours as needed for mild pain, Disp: , Rfl:   •  lisinopril (ZESTRIL) 20 mg tablet, Take 20 mg by mouth daily, Disp: , Rfl:     ALLERGIES:  Allergies   Allergen Reactions   • Corylus Itching   • Proanthocyanidin Swelling   • Shrimp Extract Allergy Skin Test - Food Allergy Hives   • Apple Allergy Skin Test - Food Allergy Facial Swelling   • Ibuprofen Swelling   • Penicillins Edema       Review of systems:   Constitutional: Negative for fatigue, fever or loss of apetite. HENT: Negative. Respiratory: Negative for shortness of breath, dyspnea. Cardiovascular: Negative for chest pain/tightness. Gastrointestinal: Negative for abdominal pain, N/V. Endocrine: Negative for cold/heat intolerance, unexplained weight loss/gain. Genitourinary: Negative for flank pain, dysuria, hematuria. Musculoskeletal: As noted in HPI. Skin: Negative for rash. Neurological: Negative  Psychiatric/Behavioral: Negative for agitation.   _____________________________________________________  PHYSICAL EXAMINATION:    Blood pressure 115/70, pulse 78, temperature 98 °F (36.7 °C), temperature source Temporal, height 5' (1.524 m), weight 58.5 kg (129 lb). General: well developed and well nourished, alert, oriented times 3 and appears comfortable  Psychiatric: Normal  HEENT: Benign  Cardiovascular: Regular    Pulmonary: No wheezing or stridor  Abdomen: Soft, Nontender  Skin: No masses, erythema, lacerations, fluctation, ulcerations  Neurovascular: Intact     MUSCULOSKELETAL EXAMINATION:  right Shoulder -   No anatomical deformity  Skin is warm and dry to touch with no signs of erythema, ecchymosis, or infection  No soft tissue swelling or effusion noted  No palpable crepitus with passive motion  No tenderness upon palpation  ROM FF 0-160, ABD 0-170, ER 0-80  Strength: 5/5 ER, 5/5 IR with pain, 5/5 ABD with pain, 5/5 FF  No glenohumeral instability appreciated on exam  - Sulcus   + Hawkins's with internal rotation, no aggravation of pain in external rotation  - empty can, - drop-arm, - belly press, - resisted external rotation  Demonstrates normal elbow, wrist, and finger motion  2+  distal radial pulse with brisk capillary refill to the fingers  Radial, median, ulnar and motor  and sensory distribution intact  Sensation to light touch intact distally      _____________________________________________________  STUDIES REVIEWED:    MRI of right shoulder taken on 4/12/23 was reviewed and demonstrates a partial thickness tear of the supraspinatus tendon, tendinitis of the supraspinatus. The MRI report was reviewed in the note of Dr. Mike Mosley dated 8/23/2023. Dr. Blanche Yeung notes were reviewed.         Scribe Attestation    I,:  Swati Canales am acting as a scribe while in the presence of the attending physician.:       I,:  Maximilian Ellis personally performed the services described in this documentation    as scribed in my presence.:

## 2023-09-27 NOTE — TELEPHONE ENCOUNTER
Faxed office visit notes and work note to [facility] to fax number in previous encounter message.     Fax #: [phone]

## 2023-09-27 NOTE — LETTER
September 27, 2023     Patient: Clint Mcallister  YOB: 1980  Date of Visit: 9/27/2023      To Whom it May Concern:    Clint Mcallister is under my professional care. Doreen Farley was seen in my office on 9/27/2023. Doreen Farley may return to work with limitations including :   - Right arm work to shoulder height only    - No lifting over 10lbs     If you have any questions or concerns, please don't hesitate to call.          Sincerely,          Alison Rondon        CC: No Recipients

## 2023-09-27 NOTE — TELEPHONE ENCOUNTER
Caller: Zak Garcia the  at [facility] claim    Doctor: Dr Louise Haq     Reason for call: Zak Garcia the  at [facility] is calling in wanting to know if the patient was seen in the office today 9/27 with the Dr. She wanted to obtain the office notes along with the work status/note to be faxed over to her at [phone]. Please fax.      Call back#: [phone]

## 2023-09-28 ENCOUNTER — OFFICE VISIT (OUTPATIENT)
Age: 43
End: 2023-09-28
Payer: OTHER MISCELLANEOUS

## 2023-09-28 DIAGNOSIS — M75.21 BICIPITAL TENDINITIS OF RIGHT SHOULDER: Primary | ICD-10-CM

## 2023-09-28 DIAGNOSIS — M54.2 NECK PAIN: ICD-10-CM

## 2023-09-28 DIAGNOSIS — M54.2 CERVICALGIA: ICD-10-CM

## 2023-09-28 DIAGNOSIS — S46.911D STRAIN OF RIGHT SHOULDER, SUBSEQUENT ENCOUNTER: ICD-10-CM

## 2023-09-28 DIAGNOSIS — M67.819 TENDINOSIS OF ROTATOR CUFF: ICD-10-CM

## 2023-09-28 DIAGNOSIS — G89.29 CHRONIC RIGHT SHOULDER PAIN: ICD-10-CM

## 2023-09-28 DIAGNOSIS — M25.511 CHRONIC RIGHT SHOULDER PAIN: ICD-10-CM

## 2023-09-28 PROCEDURE — 97110 THERAPEUTIC EXERCISES: CPT | Performed by: PHYSICAL THERAPIST

## 2023-09-28 PROCEDURE — 97140 MANUAL THERAPY 1/> REGIONS: CPT | Performed by: PHYSICAL THERAPIST

## 2023-09-28 PROCEDURE — 97112 NEUROMUSCULAR REEDUCATION: CPT | Performed by: PHYSICAL THERAPIST

## 2023-09-28 NOTE — PROGRESS NOTES
Daily Note     Today's date: 2023  Patient name: Alonso Christianson  : 1980  MRN: [id number]  Referring provider: Dick Morales MD  Dx:   Encounter Diagnosis     ICD-10-CM    1. Bicipital tendinitis of right shoulder  M75.21       2. Strain of right shoulder, subsequent encounter  S46.911D       3. Neck pain  M54.2       4. Chronic right shoulder pain  M25.511     G89.29       5. Tendinosis of rotator cuff  M67.819       6. Cervicalgia  M54.2           Start Time: 845  Stop Time: 930  Total time in clinic (min): 45 minutes    Subjective: pt notes she's had HA for the last 24hr and didn't sleep well last night. Shoulder still stiff. Objective: See treatment diary below      Assessment: Good initial response to POC and MT. Has ST restricted motion and R pec tightness contributing to decreased UE motion. Scap mobility improved following MT. AROM improving. Postural mm activation exercises expanded per flowsheet below. Plan: Continue per plan of care. Progress treatment as tolerated.        Precautions: none     Daily Treatment Diary:      Initial Evaluation Date: 23  Compliance                  Visit Number 1 2  3                 Re-Eval  IE                 MC   Foto Captured y                                            Manual                      Scap glide/distraction   8'  10'                 CTJ HVLAT   5'  5'                 K Tape Lower Trap   5'                   Ther-Ex                      Thoracic Ext 3x10  3x10  3x10                 Thoracic ROT x10 (B)  x15 (B) x15 (B)                 5-way shoulder ISO 5x5" ea  5x5" ea  5x5" ea                 (B) Banded ER x10 5"  NR                   Pec minor stretch c 1/2 roll   4'  4'                 Shoulder flexion c cane  x20  x20                 Banded Row     Blk 3x15                 Scap    2# 2x10 5" ecc                                       Edu  5"                     Neuro Re-Ed (B) Banded ER   Grn 2x10 5"  Grn 2x10 5"                 Prone Scap Ret  10x10" 10x10"                                                 Ther-Act                                                               Modalities

## 2023-10-03 ENCOUNTER — OFFICE VISIT (OUTPATIENT)
Age: 43
End: 2023-10-03
Payer: OTHER MISCELLANEOUS

## 2023-10-03 DIAGNOSIS — M54.2 NECK PAIN: ICD-10-CM

## 2023-10-03 DIAGNOSIS — G89.29 CHRONIC RIGHT SHOULDER PAIN: ICD-10-CM

## 2023-10-03 DIAGNOSIS — M75.21 BICIPITAL TENDINITIS OF RIGHT SHOULDER: Primary | ICD-10-CM

## 2023-10-03 DIAGNOSIS — M54.2 CERVICALGIA: ICD-10-CM

## 2023-10-03 DIAGNOSIS — S46.911D STRAIN OF RIGHT SHOULDER, SUBSEQUENT ENCOUNTER: ICD-10-CM

## 2023-10-03 DIAGNOSIS — M25.511 CHRONIC RIGHT SHOULDER PAIN: ICD-10-CM

## 2023-10-03 PROCEDURE — 97112 NEUROMUSCULAR REEDUCATION: CPT | Performed by: PHYSICAL THERAPIST

## 2023-10-03 PROCEDURE — 97140 MANUAL THERAPY 1/> REGIONS: CPT | Performed by: PHYSICAL THERAPIST

## 2023-10-03 PROCEDURE — 97110 THERAPEUTIC EXERCISES: CPT | Performed by: PHYSICAL THERAPIST

## 2023-10-03 NOTE — PROGRESS NOTES
Daily Note     Today's date: 10/3/2023  Patient name: Amarilis Hairston  : 1980  MRN: [id number]  Referring provider: Glenys Hogan MD  Dx:   Encounter Diagnosis     ICD-10-CM    1. Bicipital tendinitis of right shoulder  M75.21       2. Strain of right shoulder, subsequent encounter  S46.911D       3. Cervicalgia  M54.2       4. Neck pain  M54.2       5. Chronic right shoulder pain  M25.511     G89.29           Start Time: 845  Stop Time: 930  Total time in clinic (min): 45 minutes    Subjective: pt notes L shoulder and scapula feeling better c decreased spasm. R UT and shoulder remain in spasm. Objective: See treatment diary below      Assessment: Good initial response to POC and MT. Has ST restricted motion and R pec tightness contributing to decreased UE motion. Pec and UT stretching well tolerated. UE strengthening increased to improve activity tolerance. AROM improving. Postural mm activation exercises expanded per flowsheet below. Plan: Continue per plan of care. Progress treatment as tolerated.        Precautions: none     Daily Treatment Diary:      Initial Evaluation Date: 23  Compliance 9/21  9/26  9/28  10/3               Visit Number 1 2  3  4               Re-Eval  [address] Captured y                           9/21  9/26  9/28  10/3               Manual                      Scap glide/distraction   8'  10'  10'               CTJ HVLAT   5'  5'                K Tape Lower Trap   5'                   Ther-Ex                      Thoracic Ext 3x10  3x10  3x10  3x10               Thoracic ROT x10 (B)  x15 (B) x15 (B)  x15 (B)               5-way shoulder ISO 5x5" ea  5x5" ea  5x5" ea  5x5" ea               (B) Banded ER x10 5"  NR                   Pec minor stretch c 1/2 roll   4'  4'  4'               Seated UT stretch    3'         IR stretch c strap    4x30"         Shoulder flexion c cane  x20  x20  x20               Banded Row     Blk 3x15 Blk 3x15 Scap    2# 2x10 5" ecc 2# 2x10 5" ecc                                     Edu  5"                     Neuro Re-Ed                      (B) Banded ER   Grn 2x10 5"  Grn 2x10 5"                 Prone Scap Ret  10x10" 10x10"                                                 Ther-Act                                                               Modalities

## 2023-10-05 ENCOUNTER — OFFICE VISIT (OUTPATIENT)
Age: 43
End: 2023-10-05
Payer: OTHER MISCELLANEOUS

## 2023-10-05 DIAGNOSIS — M67.819 TENDINOSIS OF ROTATOR CUFF: ICD-10-CM

## 2023-10-05 DIAGNOSIS — S46.911D STRAIN OF RIGHT SHOULDER, SUBSEQUENT ENCOUNTER: ICD-10-CM

## 2023-10-05 DIAGNOSIS — G89.29 CHRONIC RIGHT SHOULDER PAIN: ICD-10-CM

## 2023-10-05 DIAGNOSIS — M54.2 NECK PAIN: Primary | ICD-10-CM

## 2023-10-05 DIAGNOSIS — M54.2 CERVICALGIA: ICD-10-CM

## 2023-10-05 DIAGNOSIS — M25.511 CHRONIC RIGHT SHOULDER PAIN: ICD-10-CM

## 2023-10-05 DIAGNOSIS — M75.21 BICIPITAL TENDINITIS OF RIGHT SHOULDER: ICD-10-CM

## 2023-10-05 PROCEDURE — 97140 MANUAL THERAPY 1/> REGIONS: CPT | Performed by: PHYSICAL THERAPIST

## 2023-10-05 PROCEDURE — 97110 THERAPEUTIC EXERCISES: CPT | Performed by: PHYSICAL THERAPIST

## 2023-10-05 PROCEDURE — 97112 NEUROMUSCULAR REEDUCATION: CPT | Performed by: PHYSICAL THERAPIST

## 2023-10-05 NOTE — PROGRESS NOTES
Daily Note     Today's date: 10/5/2023  Patient name: Amarilis Hairston  : 1980  MRN: [id number]  Referring provider: Glenys Hogan MD  Dx:   Encounter Diagnosis     ICD-10-CM    1. Neck pain  M54.2       2. Cervicalgia  M54.2       3. Strain of right shoulder, subsequent encounter  S46.911D       4. Bicipital tendinitis of right shoulder  M75.21       5. Chronic right shoulder pain  M25.511     G89.29       6. Tendinosis of rotator cuff  M67.819           Start Time: 845  Stop Time: 932  Total time in clinic (min): 47 minutes    Subjective: pt reports some R shoulder relief. Lifting remains limited. Objective: See treatment diary below      Assessment: Good initial response to POC and MT. Thoracic mobility improving. UT and scapular mm tightness continues and limits pain-free motion. UE strengthening increased to improve activity tolerance. AROM improving. Postural mm activation exercises expanded per flowsheet below. Plan: Continue per plan of care. Progress treatment as tolerated.        Precautions: none     Daily Treatment Diary:      Initial Evaluation Date: 23  Compliance 9/21  9/26  9/28  10/3  10/5             Visit Number 1 2  3  4  5             Re-Eval  IE                 MC   Foto Captured y                           9/21  9/26  9/28  10/3  10/5             Manual                      Scap glide/distraction   8'  10'  10'  10'             CTJ HVLAT   5'  5'                K Tape Lower Trap   5'                   Ther-Ex                      Thoracic Ext 3x10  3x10  3x10  3x10  3x10             Thoracic ROT x10 (B)  x15 (B) x15 (B)  x15 (B)  x15 (B)             5-way shoulder ISO 5x5" ea  5x5" ea  5x5" ea  5x5" ea  5x5" ea             (B) Banded ER x10 5"  NR                   Pec minor stretch c 1/2 roll   4'  4'  4' 4'             Seated UT stretch    3' 3'        IR stretch c strap    4x30" 4x30"        Shoulder flexion c cane  x20  x20  x20  x20             Banded Row Blk 3x15 Blk 3x15  Blk 3x15             Scap    2# 2x10 5" ecc 2# 2x10 5" ecc  2# 2x10 5" ecc                                   Edu  5"                     Neuro Re-Ed                      (B) Banded ER   Grn 2x10 5"  Grn 2x10 5"    Grn 2x10 5"             Prone Scap Ret  10x10" 10x10"  10x10"                                               Ther-Act                                                               Modalities

## 2023-10-12 ENCOUNTER — APPOINTMENT (OUTPATIENT)
Age: 43
End: 2023-10-12
Payer: OTHER MISCELLANEOUS

## 2023-10-17 ENCOUNTER — OFFICE VISIT (OUTPATIENT)
Age: 43
End: 2023-10-17
Payer: OTHER MISCELLANEOUS

## 2023-10-17 DIAGNOSIS — M25.511 CHRONIC RIGHT SHOULDER PAIN: ICD-10-CM

## 2023-10-17 DIAGNOSIS — M54.2 CERVICALGIA: ICD-10-CM

## 2023-10-17 DIAGNOSIS — G89.29 CHRONIC RIGHT SHOULDER PAIN: ICD-10-CM

## 2023-10-17 DIAGNOSIS — S46.911D STRAIN OF RIGHT SHOULDER, SUBSEQUENT ENCOUNTER: ICD-10-CM

## 2023-10-17 DIAGNOSIS — M54.2 NECK PAIN: Primary | ICD-10-CM

## 2023-10-17 DIAGNOSIS — M67.819 TENDINOSIS OF ROTATOR CUFF: ICD-10-CM

## 2023-10-17 DIAGNOSIS — M75.21 BICIPITAL TENDINITIS OF RIGHT SHOULDER: ICD-10-CM

## 2023-10-17 PROCEDURE — 97110 THERAPEUTIC EXERCISES: CPT | Performed by: PHYSICAL THERAPIST

## 2023-10-17 PROCEDURE — 97140 MANUAL THERAPY 1/> REGIONS: CPT | Performed by: PHYSICAL THERAPIST

## 2023-10-17 PROCEDURE — 97112 NEUROMUSCULAR REEDUCATION: CPT | Performed by: PHYSICAL THERAPIST

## 2023-10-17 NOTE — PROGRESS NOTES
Daily Note     Today's date: 10/18/2023  Patient name: Malu Bean  : 1980  MRN: [id number]  Referring provider: Lara Claire MD  Dx:   Encounter Diagnosis     ICD-10-CM    1. Neck pain  M54.2       2. Chronic right shoulder pain  M25.511     G89.29       3. Cervicalgia  M54.2       4. Tendinosis of rotator cuff  M67.819       5. Bicipital tendinitis of right shoulder  M75.21       6. Strain of right shoulder, subsequent encounter  S46.911D           Start Time: 845  Stop Time: 935  Total time in clinic (min): 50 minutes    Subjective: pt missed last week due to illness. Notes slight improvement in symp since last seen. She notes pain at base of neck. Objective: See treatment diary below      Assessment: Thoracic and scapular mobility improving. UT tightness continues and limits pain-free motion. UE strengthening increased to improve activity tolerance. Shoulder AROM improving c pt now at 140* flexion pain-free. Altagracia Willams Postural mm activation exercises expanded per flowsheet below. Plan: Continue per plan of care. Progress treatment as tolerated.        Precautions: none     Daily Treatment Diary:      Initial Evaluation Date: 23  Compliance 9/21  9/26  9/28  10/3  10/5  10/17           Visit Number 1 2  3  4  5  6           Re-Eval  IE                 MC   Foto Captured y                           9/21  9/26  9/28  10/3  10/5  10/17           Manual                      Scap glide/distraction   8'  10'  10'  10'  8'           CTJ HVLAT   5'  5'     5'           K Tape Lower Trap   5'                   Ther-Ex                      Thoracic Ext 3x10  3x10  3x10  3x10  3x10  3x10           Thoracic ROT x10 (B)  x15 (B) x15 (B)  x15 (B)  x15 (B)  x15 (B)           5-way shoulder ISO 5x5" ea  5x5" ea  5x5" ea  5x5" ea  5x5" ea  5x5" ea           (B) Banded ER x10 5"  NR                   Pec minor stretch c 1/2 roll   4'  4'  4' 4'  4'           Seated UT stretch    3' 3' 3'       IR stretch c strap    4x30" 4x30" 4x30"       Shoulder flexion c cane  x20  x20  x20  x20  x20           Banded Row     Blk 3x15 Blk 3x15  Blk 3x15  10#  3x15           Scap    2# 2x10 5" ecc 2# 2x10 5" ecc  2# 2x10 5" ecc  2# 2x10 5" ecc                                 Edu  5"                     Neuro Re-Ed                      (B) Banded ER   Grn 2x10 5"  Grn 2x10 5"    Grn 2x10 5"  Grn 2x10 5"           Prone Scap Ret  10x10" 10x10"  10x10" 10x10"                                              Ther-Act                                                               Modalities

## 2023-10-19 ENCOUNTER — OFFICE VISIT (OUTPATIENT)
Age: 43
End: 2023-10-19
Payer: OTHER MISCELLANEOUS

## 2023-10-19 DIAGNOSIS — M54.2 CERVICALGIA: ICD-10-CM

## 2023-10-19 DIAGNOSIS — S46.911D STRAIN OF RIGHT SHOULDER, SUBSEQUENT ENCOUNTER: ICD-10-CM

## 2023-10-19 DIAGNOSIS — M67.819 TENDINOSIS OF ROTATOR CUFF: ICD-10-CM

## 2023-10-19 DIAGNOSIS — G89.29 CHRONIC RIGHT SHOULDER PAIN: ICD-10-CM

## 2023-10-19 DIAGNOSIS — M75.21 BICIPITAL TENDINITIS OF RIGHT SHOULDER: ICD-10-CM

## 2023-10-19 DIAGNOSIS — M25.511 CHRONIC RIGHT SHOULDER PAIN: ICD-10-CM

## 2023-10-19 DIAGNOSIS — M54.2 NECK PAIN: Primary | ICD-10-CM

## 2023-10-19 PROCEDURE — 97140 MANUAL THERAPY 1/> REGIONS: CPT | Performed by: PHYSICAL THERAPIST

## 2023-10-19 PROCEDURE — 97112 NEUROMUSCULAR REEDUCATION: CPT | Performed by: PHYSICAL THERAPIST

## 2023-10-19 PROCEDURE — 97110 THERAPEUTIC EXERCISES: CPT | Performed by: PHYSICAL THERAPIST

## 2023-10-19 NOTE — PROGRESS NOTES
Daily Note     Today's date: 10/19/2023  Patient name: Mehran Valencia  : 1980  MRN: [id number]  Referring provider: Keegan Ch MD  Dx:   Encounter Diagnosis     ICD-10-CM    1. Neck pain  M54.2       2. Chronic right shoulder pain  M25.511     G89.29       3. Tendinosis of rotator cuff  M67.819       4. Cervicalgia  M54.2       5. Strain of right shoulder, subsequent encounter  S46.067G       6. Bicipital tendinitis of right shoulder  M75.21           Start Time: 840  Stop Time: 930  Total time in clinic (min): 50 minutes    Subjective: pt notes neck is very stiff today L>R. R shoulder AROM improving, but still painful OH. Objective: See treatment diary below      Assessment: Thoracic and scapular mobility improving. UT tightness continues and limits pain-free motion. UE strengthening increased to improve activity tolerance. Completed resisted PNF diagonals on R to improve functional reach carry tasks. Pt did have pain in area of LHB tendon at end range shoulder ER. Postural mm activation exercises expanded per flowsheet below. Plan: Continue per plan of care. Progress treatment as tolerated.        Precautions: none     Daily Treatment Diary:      Initial Evaluation Date: 23  Compliance 9/21  9/26  9/28  10/3  10/5  10/17  10/19         Visit Number 1 2  3  4  5  6  7         Re-Eval  IE                 MC   Foto Captured y                           9/21  9/26  9/28  10/3  10/5  10/17  10/19         Manual                      Scap glide/distraction   8'  10'  10'  10'  8'  5'         CTJ HVLAT   5'  5'     5'  5'         K Tape Lower Trap   5'                   Ther-Ex                      Thoracic Ext 3x10  3x10  3x10  3x10  3x10  3x10  3x10         Thoracic ROT x10 (B)  x15 (B) x15 (B)  x15 (B)  x15 (B)  x15 (B)  x15 (B)         5-way shoulder ISO 5x5" ea  5x5" ea  5x5" ea  5x5" ea  5x5" ea  5x5" ea  5x5" ea         (B) Banded ER x10 5"  NR                   Pec minor stretch c 1/2 roll   4'  4'  4' 4'  4'  4'         Seated UT stretch    3' 3' 3' 3'      IR stretch c strap    4x30" 4x30" 4x30" 4x30"      Shoulder flexion c cane  x20  x20  x20  x20  x20  x20         Banded Row     Blk 3x15 Blk 3x15  Blk 3x15  10#  3x15  10#  3x15         Scap    2# 2x10 5" ecc 2# 2x10 5" ecc  2# 2x10 5" ecc  2# 2x10 5" ecc  2# 2x10 5" ecc                               Edu  5"                     Neuro Re-Ed                      (B) Banded ER   Grn 2x10 5"  Grn 2x10 5"    Grn 2x10 5"  Grn 2x10 5"  Grn 2x10 5"         Prone Scap Ret  10x10" 10x10"  10x10" 10x10" 10x10"      PNF D2 MRE       3x10                                Ther-Act                                                               Modalities

## 2023-10-24 ENCOUNTER — OFFICE VISIT (OUTPATIENT)
Age: 43
End: 2023-10-24
Payer: OTHER MISCELLANEOUS

## 2023-10-24 DIAGNOSIS — M54.2 CERVICALGIA: ICD-10-CM

## 2023-10-24 DIAGNOSIS — M25.511 CHRONIC RIGHT SHOULDER PAIN: ICD-10-CM

## 2023-10-24 DIAGNOSIS — M54.2 NECK PAIN: Primary | ICD-10-CM

## 2023-10-24 DIAGNOSIS — M67.819 TENDINOSIS OF ROTATOR CUFF: ICD-10-CM

## 2023-10-24 DIAGNOSIS — S46.911D STRAIN OF RIGHT SHOULDER, SUBSEQUENT ENCOUNTER: ICD-10-CM

## 2023-10-24 DIAGNOSIS — M75.21 BICIPITAL TENDINITIS OF RIGHT SHOULDER: ICD-10-CM

## 2023-10-24 DIAGNOSIS — G89.29 CHRONIC RIGHT SHOULDER PAIN: ICD-10-CM

## 2023-10-24 PROCEDURE — 97110 THERAPEUTIC EXERCISES: CPT | Performed by: PHYSICAL THERAPIST

## 2023-10-24 PROCEDURE — 97112 NEUROMUSCULAR REEDUCATION: CPT | Performed by: PHYSICAL THERAPIST

## 2023-10-24 PROCEDURE — 97140 MANUAL THERAPY 1/> REGIONS: CPT | Performed by: PHYSICAL THERAPIST

## 2023-10-24 NOTE — PROGRESS NOTES
Daily Note     Today's date: 10/24/2023  Patient name: José Olguin  : 1980  MRN: [id number]  Referring provider: Selwyn Felty, MD  Dx:   Encounter Diagnosis     ICD-10-CM    1. Neck pain  M54.2       2. Tendinosis of rotator cuff  M67.819       3. Cervicalgia  M54.2       4. Chronic right shoulder pain  M25.511     G89.29       5. Bicipital tendinitis of right shoulder  M75.21       6. Strain of right shoulder, subsequent encounter  S46.911D           Start Time: 845  Stop Time: 930  Total time in clinic (min): 45 minutes    Subjective: pt notes neck doing well today. CC is R shoulder pain. Notes weakness is most prominent. Objective: See treatment diary below      Assessment: UE strengthening increased to improve activity tolerance and reach/carry tasks. Completed resisted PNF diagonals on R to improve functional reach carry tasks. Painful arc starts higher at 120*. Pt did have pain in area of LHB tendon at end range shoulder ER. Postural mm activation exercises expanded per flowsheet below. Plan: Continue per plan of care. Progress treatment as tolerated.        Precautions: none     Daily Treatment Diary:      Initial Evaluation Date: 23  Compliance 9/21  9/26  9/28  10/3  10/5  10/17  10/19  10/24       Visit Number 1 2  3  4  5  6  7  8       Re-Eval  IE                 [facility]   Foto Captured y                           9/21  9/26  9/28  10/3  10/5  10/17  10/19  10/24       Manual                      Scap glide/distraction   8'  10'  10'  10'  8'  5'  5'       CTJ HVLAT   5'  5'     5'  5'  5'       K Tape Lower Trap   5'                   Ther-Ex                      Thoracic Ext 3x10  3x10  3x10  3x10  3x10  3x10  3x10  3x10       Thoracic ROT x10 (B)  x15 (B) x15 (B)  x15 (B)  x15 (B)  x15 (B)  x15 (B)  x15 (B)       5-way shoulder ISO 5x5" ea  5x5" ea  5x5" ea  5x5" ea  5x5" ea  5x5" ea  5x5" ea  D/C       (B) Banded ER x10 5"  NR                   Pec minor stretch c 1/2 roll 4'  4'  4' 4'  4'  4'  D/C       Seated UT stretch    3' 3' 3' 3' 3'     IR stretch c strap    4x30" 4x30" 4x30" 4x30" 4x30"     Shoulder flexion c cane  x20  x20  x20  x20  x20  x20  Sup Full Arc  1# 2x20       Banded Row     Blk 3x15 Blk 3x15  Blk 3x15  10#  3x15  10#  3x15  10#  3x15       Scap    2# 2x10 5" ecc 2# 2x10 5" ecc  2# 2x10 5" ecc  2# 2x10 5" ecc  2# 2x10 5" ecc  2# 2x10 5" ecc                             Edu  5"                     Neuro Re-Ed                      (B) Banded ER   Grn 2x10 5"  Grn 2x10 5"    Grn 2x10 5"  Grn 2x10 5"  Grn 2x10 5"  Grn 2x10 5"       Prone Scap Ret  10x10" 10x10"  10x10" 10x10" 10x10" 10x10"     PNF D2 MRE       3x10 3x10                               Ther-Act                                                               Modalities

## 2023-10-26 ENCOUNTER — APPOINTMENT (OUTPATIENT)
Age: 43
End: 2023-10-26
Payer: OTHER MISCELLANEOUS

## 2023-10-31 ENCOUNTER — APPOINTMENT (OUTPATIENT)
Age: 43
End: 2023-10-31
Payer: OTHER MISCELLANEOUS

## 2023-11-01 ENCOUNTER — CONSULT (OUTPATIENT)
Dept: PAIN MEDICINE | Facility: CLINIC | Age: 43
End: 2023-11-01
Payer: OTHER MISCELLANEOUS

## 2023-11-01 VITALS
DIASTOLIC BLOOD PRESSURE: 78 MMHG | SYSTOLIC BLOOD PRESSURE: 122 MMHG | WEIGHT: 133 LBS | TEMPERATURE: 97.6 F | OXYGEN SATURATION: 98 % | HEIGHT: 60 IN | HEART RATE: 76 BPM | BODY MASS INDEX: 26.11 KG/M2

## 2023-11-01 DIAGNOSIS — M54.12 CERVICAL RADICULOPATHY: Primary | ICD-10-CM

## 2023-11-01 PROCEDURE — 99244 OFF/OP CNSLTJ NEW/EST MOD 40: CPT | Performed by: ANESTHESIOLOGY

## 2023-11-01 RX ORDER — LIDOCAINE HYDROCHLORIDE 10 MG/ML
5 INJECTION, SOLUTION EPIDURAL; INFILTRATION; INTRACAUDAL; PERINEURAL ONCE
OUTPATIENT
Start: 2023-11-01 | End: 2023-11-01

## 2023-11-01 RX ORDER — METHYLPREDNISOLONE ACETATE 80 MG/ML
80 INJECTION, SUSPENSION INTRA-ARTICULAR; INTRALESIONAL; INTRAMUSCULAR; PARENTERAL; SOFT TISSUE ONCE
OUTPATIENT
Start: 2023-11-01 | End: 2023-11-01

## 2023-11-01 RX ORDER — 0.9 % SODIUM CHLORIDE 0.9 %
1 VIAL (ML) INJECTION ONCE
OUTPATIENT
Start: 2023-11-01 | End: 2023-11-01

## 2023-11-01 RX ORDER — LISINOPRIL AND HYDROCHLOROTHIAZIDE 20; 12.5 MG/1; MG/1
1 TABLET ORAL DAILY
COMMUNITY
Start: 2023-10-13

## 2023-11-01 RX ORDER — GABAPENTIN 300 MG/1
300 CAPSULE ORAL 3 TIMES DAILY
Qty: 90 CAPSULE | Refills: 2 | Status: SHIPPED | OUTPATIENT
Start: 2023-11-01

## 2023-11-01 NOTE — PROGRESS NOTES
Assessment  1. Cervical radiculopathy - Right  -     gabapentin (NEURONTIN) 300 mg capsule; Take 1 capsule (300 mg total) by mouth 3 (three) times a day  -     Case request operating room: BLOCK / INJECTION EPIDURAL STEROID CERVICAL C6-C7 or alternate level; Standing  -     Case request operating room: BLOCK / INJECTION EPIDURAL STEROID CERVICAL C6-C7 or alternate level    Right, greater than left sided cervical radicular pain in C4 and C5 dermatomal distribution accompanied by pain limited weakness numbness and paresthesias. Patient has been a full participant with PT. Chronic pain with decreased participation with IADLs over the past few years. Has been taking NSAIDs and tramadol infrequently with modest benefit. 5/5 strength bilaterally in upper extremities with AROM, negative spurling's maneuver,b/l. Additionally there is positive cervical facet loading eliciting pain, left greater than right. Negative Shrestha's, denies any gait instability, saddle anesthesia. On MRI imaging Scattered multilevel spondylotic changes most pronounced at C4-5 eccentrically to the right contributing to right sided neural foraminal stenosis. At lower levels stenosis in transforaminal region more so on the left. No cord compression or cord signal abnormality. Risks, benefits alternatives to epidural steroid injections thoroughly discussed with patient. Handouts provided questions answered to patient's satisfaction. Lifestyle modifications extensively discussed including sleep hygiene, neck posture, diet, exercise and weight loss in conjunction with PT. Will proceed with multimodal pain therapy plan as noted below:    Plan  -C6-C7 ILESI or alternate level; f/u 2 weeks post procedure  -gabapentin 300 mg t.i.d. Ordered for patient; counseled regarding sedative effects of taking this medication and provided up titration calendar.   Counseled not to take medication while driving or operating heavy machinery/using stairs  -has completed formal physical therapy for right-sided cervical spondylosis/radiculopathy; Physician directed home exercise plan as per [facility] demonstrated and handouts provided that patient plans to participate with for 1 hour, twice a week for the next 6 weeks. There are risks associated with opioid medications, including dependence, addiction and tolerance. The patient understands and agrees to use these medications only as prescribed. Potential side effects of the medications include, but are not limited to, constipation, drowsiness, addiction, impaired judgment and risk of fatal overdose if not taken as prescribed. The patient was warned against driving while taking sedation medications or operating heavy machinery. The patient voiced understanding. Sharing medications is a felony. At this point in time, the patient is showing no signs of addiction, abuse, diversion or suicidal ideation. [place] Prescription Drug Monitoring Program report was reviewed and was appropriate      Complete risks and benefits including bleeding, infection, tissue reaction, nerve injury and allergic reaction were discussed. The approach was demonstrated using models and literature was provided. Verbal and written consent was obtained. My impressions and treatment recommendations were discussed in detail with the patient who verbalized understanding and had no further questions. Discharge instructions were provided. I personally saw and examined the patient and I agree with the above discussed plan of care.     New Medications Ordered This Visit   Medications    lisinopril-hydrochlorothiazide (PRINZIDE,ZESTORETIC) 20-12.5 MG per tablet     Sig: Take 1 tablet by mouth daily    gabapentin (NEURONTIN) 300 mg capsule     Sig: Take 1 capsule (300 mg total) by mouth 3 (three) times a day     Dispense:  90 capsule     Refill:  2       History of Present Illness    Kayode Lopez is a 37 y.o. female with pmhx of HTN, presenting with chronic right-sided neck pain described primarily as radicular nature since suffering work related injury in March of 2023. The pain radiates in the right C4 and C5 dermatomal distributions and is primarily right sided. The pain contributes to significant disability in participation with independent activities of daily living and is accompanied by weakness numbness and paresthesias that are debilitating in nature. The patient describes that overhead maneuvers such as combing hair is significantly limiting with respect to strength in the right arm/hand. The patient has been to physical therapy with modest benefit. She has trialed conservative measures including naproxen and gabapentin for the pain but has not trialed any steroids. She has never had interventional pain procedures in the past including any cervical interlaminar epidural steroid injections in the past for this pain; injections in right shoulder did not help significantly. Denies any gait abnormality, saddle anesthesia or bowel/bladder abnormality. I have personally reviewed and/or updated the patient's past medical history, past surgical history, family history, social history, current medications, allergies, and vital signs today. Review of Systems   Constitutional:  Positive for activity change. HENT: Negative. Eyes: Negative. Respiratory: Negative. Cardiovascular: Negative. Gastrointestinal: Negative. Endocrine: Negative. Genitourinary: Negative. Musculoskeletal:  Positive for arthralgias, back pain, myalgias, neck pain and neck stiffness. Negative for gait problem. Skin: Negative. Allergic/Immunologic: Negative. Neurological:  Positive for weakness and numbness. Hematological: Negative. Psychiatric/Behavioral: Negative. All other systems reviewed and are negative.       Patient Active Problem List   Diagnosis    Cervical radiculopathy - Right       Past Medical History:   Diagnosis Date Hypertension        Past Surgical History:   Procedure Laterality Date    LAPAROSCOPY      endometriosis       History reviewed. No pertinent family history. Social History     Occupational History    Not on file   Tobacco Use    Smoking status: Never    Smokeless tobacco: Never   Vaping Use    Vaping Use: Never used   Substance and Sexual Activity    Alcohol use: Never    Drug use: Never    Sexual activity: Not on file       Current Outpatient Medications on File Prior to Visit   Medication Sig    acetaminophen (TYLENOL) 325 mg tablet Take 650 mg by mouth every 6 (six) hours as needed for mild pain    lisinopril (ZESTRIL) 20 mg tablet Take 20 mg by mouth daily    lisinopril-hydrochlorothiazide (PRINZIDE,ZESTORETIC) 20-12.5 MG per tablet Take 1 tablet by mouth daily (Patient not taking: Reported on 11/1/2023)     No current facility-administered medications on file prior to visit. Allergies   Allergen Reactions    Corylus Itching    Proanthocyanidin Swelling    Shrimp Extract Allergy Skin Test - Food Allergy Hives    Apple Allergy Skin Test - Food Allergy Facial Swelling    Ibuprofen Swelling    Penicillins Edema     Physical Exam    /78 (BP Location: Left arm, Patient Position: Sitting, Cuff Size: Adult)   Pulse 76   Temp 97.6 °F (36.4 °C)   Ht 5' (1.524 m)   Wt 60.3 kg (133 lb)   SpO2 98%   BMI 25.97 kg/m²     Constitutional: normal, well developed, well nourished, alert, in no distress and non-toxic and no overt pain behavior. Eyes: anicteric  HEENT: grossly intact  Neck: supple, symmetric, trachea midline and no masses   Pulmonary:even and unlabored  Cardiovascular:No edema or pitting edema present  Skin:Normal without rashes or lesions and well hydrated  Psychiatric:Mood and affect appropriate  Neurologic:Cranial Nerves II-XII grossly intact Sensation grossly intact; no clonus negative carmen's. Reflexes 2+ and brisk. Spurling's maneuver positive right sided  Musculoskeletal:normal gait. 5/5 strength bilaterally with AROM in upper extremities. Significant pain with cervical facet loading bilaterally and with lateral spine rotation, ttp over cervical paraspinal muscles, right greater than left. Negative phoebe's test, negative gaenslen's negative SIJ loading bilaterally. Imaging    MRI cervical spine noncontrast:     MRI imaging Scattered multilevel spondylotic changes most pronounced at C4-5 eccentrically to the right contributing to right sided neural foraminal stenosis. At lower levels stenosis in transforaminal region more so on the left. No cord compression or cord signal abnormality.

## 2023-11-01 NOTE — LETTER
November 3, 2023     Perla Rivas, [id number]  [place]    Patient: Omid Castañeda   YOB: 1980   Date of Visit: 11/1/2023       Dear Dr. Gracie Velazquez: Thank you for referring Omid Castañeda to me for evaluation. Below are my notes for this consultation. If you have questions, please do not hesitate to call me. I look forward to following your patient along with you. Sincerely,        Katelyn Massey MD        CC: No Recipients    Katelyn Massey MD  11/1/2023  1:29 PM  Signed      Assessment  1. Cervical radiculopathy - Right  -     gabapentin (NEURONTIN) 300 mg capsule; Take 1 capsule (300 mg total) by mouth 3 (three) times a day  -     Case request operating room: BLOCK / INJECTION EPIDURAL STEROID CERVICAL C6-C7 or alternate level; Standing  -     Case request operating room: BLOCK / INJECTION EPIDURAL STEROID CERVICAL C6-C7 or alternate level    Right, greater than left sided cervical radicular pain in C4 and C5 dermatomal distribution accompanied by pain limited weakness numbness and paresthesias. Patient has been a full participant with PT. Chronic pain with decreased participation with IADLs over the past few years. Has been taking NSAIDs and tramadol infrequently with modest benefit. 5/5 strength bilaterally in upper extremities with AROM, negative spurling's maneuver,b/l. Additionally there is positive cervical facet loading eliciting pain, left greater than right. Negative Shrestha's, denies any gait instability, saddle anesthesia. On MRI imaging Scattered multilevel spondylotic changes most pronounced at C4-5 eccentrically to the right contributing to right sided neural foraminal stenosis. At lower levels stenosis in transforaminal region more so on the left. No cord compression or cord signal abnormality. Risks, benefits alternatives to epidural steroid injections thoroughly discussed with patient.   Handouts provided questions answered to patient's satisfaction. Lifestyle modifications extensively discussed including sleep hygiene, neck posture, diet, exercise and weight loss in conjunction with PT. Will proceed with multimodal pain therapy plan as noted below:    Plan  -C6-C7 ILESI or alternate level; f/u 2 weeks post procedure  -gabapentin 300 mg t.i.d. Ordered for patient; counseled regarding sedative effects of taking this medication and provided up titration calendar. Counseled not to take medication while driving or operating heavy machinery/using stairs  -has completed formal physical therapy for right-sided cervical spondylosis/radiculopathy; Physician directed home exercise plan as per [facility] demonstrated and handouts provided that patient plans to participate with for 1 hour, twice a week for the next 6 weeks. There are risks associated with opioid medications, including dependence, addiction and tolerance. The patient understands and agrees to use these medications only as prescribed. Potential side effects of the medications include, but are not limited to, constipation, drowsiness, addiction, impaired judgment and risk of fatal overdose if not taken as prescribed. The patient was warned against driving while taking sedation medications or operating heavy machinery. The patient voiced understanding. Sharing medications is a felony. At this point in time, the patient is showing no signs of addiction, abuse, diversion or suicidal ideation. [place] Prescription Drug Monitoring Program report was reviewed and was appropriate      Complete risks and benefits including bleeding, infection, tissue reaction, nerve injury and allergic reaction were discussed. The approach was demonstrated using models and literature was provided. Verbal and written consent was obtained. My impressions and treatment recommendations were discussed in detail with the patient who verbalized understanding and had no further questions.   Discharge instructions were provided. I personally saw and examined the patient and I agree with the above discussed plan of care. New Medications Ordered This Visit   Medications   • lisinopril-hydrochlorothiazide (PRINZIDE,ZESTORETIC) 20-12.5 MG per tablet     Sig: Take 1 tablet by mouth daily   • gabapentin (NEURONTIN) 300 mg capsule     Sig: Take 1 capsule (300 mg total) by mouth 3 (three) times a day     Dispense:  90 capsule     Refill:  2       History of Present Illness    Joie Zee is a 37 y.o. female with pmhx of HTN, presenting with chronic right-sided neck pain described primarily as radicular nature since suffering work related injury in March of 2023. The pain radiates in the right C4 and C5 dermatomal distributions and is primarily right sided. The pain contributes to significant disability in participation with independent activities of daily living and is accompanied by weakness numbness and paresthesias that are debilitating in nature. The patient describes that overhead maneuvers such as combing hair is significantly limiting with respect to strength in the right arm/hand. The patient has been to physical therapy with modest benefit. She has trialed conservative measures including naproxen and gabapentin for the pain but has not trialed any steroids. She has never had interventional pain procedures in the past including any cervical interlaminar epidural steroid injections in the past for this pain; injections in right shoulder did not help significantly. Denies any gait abnormality, saddle anesthesia or bowel/bladder abnormality. I have personally reviewed and/or updated the patient's past medical history, past surgical history, family history, social history, current medications, allergies, and vital signs today. Review of Systems   Constitutional:  Positive for activity change. HENT: Negative. Eyes: Negative. Respiratory: Negative. Cardiovascular: Negative. Gastrointestinal: Negative. Endocrine: Negative. Genitourinary: Negative. Musculoskeletal:  Positive for arthralgias, back pain, myalgias, neck pain and neck stiffness. Negative for gait problem. Skin: Negative. Allergic/Immunologic: Negative. Neurological:  Positive for weakness and numbness. Hematological: Negative. Psychiatric/Behavioral: Negative. All other systems reviewed and are negative. Patient Active Problem List   Diagnosis   • Cervical radiculopathy - Right       Past Medical History:   Diagnosis Date   • Hypertension        Past Surgical History:   Procedure Laterality Date   • LAPAROSCOPY      endometriosis       History reviewed. No pertinent family history. Social History     Occupational History   • Not on file   Tobacco Use   • Smoking status: Never   • Smokeless tobacco: Never   Vaping Use   • Vaping Use: Never used   Substance and Sexual Activity   • Alcohol use: Never   • Drug use: Never   • Sexual activity: Not on file       Current Outpatient Medications on File Prior to Visit   Medication Sig   • acetaminophen (TYLENOL) 325 mg tablet Take 650 mg by mouth every 6 (six) hours as needed for mild pain   • lisinopril (ZESTRIL) 20 mg tablet Take 20 mg by mouth daily   • lisinopril-hydrochlorothiazide (PRINZIDE,ZESTORETIC) 20-12.5 MG per tablet Take 1 tablet by mouth daily (Patient not taking: Reported on 11/1/2023)     No current facility-administered medications on file prior to visit.        Allergies   Allergen Reactions   • Corylus Itching   • Proanthocyanidin Swelling   • Shrimp Extract Allergy Skin Test - Food Allergy Hives   • Apple Allergy Skin Test - Food Allergy Facial Swelling   • Ibuprofen Swelling   • Penicillins Edema     Physical Exam    /78 (BP Location: Left arm, Patient Position: Sitting, Cuff Size: Adult)   Pulse 76   Temp 97.6 °F (36.4 °C)   Ht 5' (1.524 m)   Wt 60.3 kg (133 lb)   SpO2 98%   BMI 25.97 kg/m²     Constitutional: normal, well developed, well nourished, alert, in no distress and non-toxic and no overt pain behavior. Eyes: anicteric  HEENT: grossly intact  Neck: supple, symmetric, trachea midline and no masses   Pulmonary:even and unlabored  Cardiovascular:No edema or pitting edema present  Skin:Normal without rashes or lesions and well hydrated  Psychiatric:Mood and affect appropriate  Neurologic:Cranial Nerves II-XII grossly intact Sensation grossly intact; no clonus negative carmen's. Reflexes 2+ and brisk. Spurling's maneuver positive right sided  Musculoskeletal:normal gait. 5/5 strength bilaterally with AROM in upper extremities. Significant pain with cervical facet loading bilaterally and with lateral spine rotation, ttp over cervical paraspinal muscles, right greater than left. Negative phoebe's test, negative gaenslen's negative SIJ loading bilaterally. Imaging    MRI cervical spine noncontrast:     MRI imaging Scattered multilevel spondylotic changes most pronounced at C4-5 eccentrically to the right contributing to right sided neural foraminal stenosis. At lower levels stenosis in transforaminal region more so on the left. No cord compression or cord signal abnormality.

## 2023-11-01 NOTE — PATIENT INSTRUCTIONS
Neck Exercises   AMBULATORY CARE:   Neck exercises  help reduce neck pain, and improve neck movement and strength. Neck exercises also help prevent long-term neck problems. Call your doctor if:   Your pain does not get better, or gets worse. You have questions or concerns about your condition, care, or exercise program.    What you need to know about exercise safety:   Move slowly, gently, and smoothly. Avoid fast or jerky motions. Stand and sit the way your healthcare provider shows you. Good posture may reduce your neck pain. Check your posture often, even when you are not doing your neck exercises. Follow the exercise program recommended by your healthcare provider. He or she will tell you which exercises are best for your condition. He or she will also tell you how many repetitions to do and how often you should do the exercises. How to perform neck exercises safely:   Exercise position:  You may sit or stand while you do neck exercises. Face forward. Your shoulders should be straight and relaxed, with a good posture. Head tilts, forward and back:  Gently bow your head and try to touch your chin to your chest. Your healthcare provider may tell you to push on the back of your neck to help bow your head. Raise your chin back to the starting position. Tilt your head back as far as possible so you are looking up at the ceiling. Your healthcare provider may tell you to lift your chin to help tilt your head back. Return your head to the starting position. Head tilts, side to side:  Tilt your head, bringing your ear toward your shoulder. Then tilt your head toward the other shoulder. Head turns:  Turn your head to look over your shoulder. Tilt your chin down and try to touch it to your shoulder. Do not raise your shoulder to your chin. Face forward again. Do the same on the other side.          Head rolls:  Slowly bring your chin toward your chest. Next, roll your head to the right. Your ear should be positioned over your shoulder. Hold this position for 5 seconds. Roll your head back toward your chest and to the left into the same position. Hold for 5 seconds. Gently roll your head back and around in a clockwise [place] 3 times. Next, move your head in the reverse direction (counterclockwise) in a [place] 3 times. Do not shrug your shoulders upwards while you do this exercise. Follow up with your doctor as directed:  Write down your questions so you remember to ask them during your visits. © Copyright [facility] 2023 Information is for End User's use only and may not be sold, redistributed or otherwise used for commercial purposes. The above information is an  only. It is not intended as medical advice for individual conditions or treatments. Talk to your doctor, nurse or pharmacist before following any medical regimen to see if it is safe and effective for you.

## 2023-11-01 NOTE — H&P (VIEW-ONLY)
Assessment  1. Cervical radiculopathy - Right  -     gabapentin (NEURONTIN) 300 mg capsule; Take 1 capsule (300 mg total) by mouth 3 (three) times a day  -     Case request operating room: BLOCK / INJECTION EPIDURAL STEROID CERVICAL C6-C7 or alternate level; Standing  -     Case request operating room: BLOCK / INJECTION EPIDURAL STEROID CERVICAL C6-C7 or alternate level    Right, greater than left sided cervical radicular pain in C4 and C5 dermatomal distribution accompanied by pain limited weakness numbness and paresthesias. Patient has been a full participant with PT. Chronic pain with decreased participation with IADLs over the past few years. Has been taking NSAIDs and tramadol infrequently with modest benefit. 5/5 strength bilaterally in upper extremities with AROM, negative spurling's maneuver,b/l. Additionally there is positive cervical facet loading eliciting pain, left greater than right. Negative Shrestha's, denies any gait instability, saddle anesthesia. On MRI imaging Scattered multilevel spondylotic changes most pronounced at C4-5 eccentrically to the right contributing to right sided neural foraminal stenosis. At lower levels stenosis in transforaminal region more so on the left. No cord compression or cord signal abnormality. Risks, benefits alternatives to epidural steroid injections thoroughly discussed with patient. Handouts provided questions answered to patient's satisfaction. Lifestyle modifications extensively discussed including sleep hygiene, neck posture, diet, exercise and weight loss in conjunction with PT. Will proceed with multimodal pain therapy plan as noted below:    Plan  -C6-C7 ILESI or alternate level; f/u 2 weeks post procedure  -gabapentin 300 mg t.i.d. Ordered for patient; counseled regarding sedative effects of taking this medication and provided up titration calendar.   Counseled not to take medication while driving or operating heavy machinery/using stairs  -has completed formal physical therapy for right-sided cervical spondylosis/radiculopathy; Physician directed home exercise plan as per [facility] demonstrated and handouts provided that patient plans to participate with for 1 hour, twice a week for the next 6 weeks. There are risks associated with opioid medications, including dependence, addiction and tolerance. The patient understands and agrees to use these medications only as prescribed. Potential side effects of the medications include, but are not limited to, constipation, drowsiness, addiction, impaired judgment and risk of fatal overdose if not taken as prescribed. The patient was warned against driving while taking sedation medications or operating heavy machinery. The patient voiced understanding. Sharing medications is a felony. At this point in time, the patient is showing no signs of addiction, abuse, diversion or suicidal ideation. [place] Prescription Drug Monitoring Program report was reviewed and was appropriate      Complete risks and benefits including bleeding, infection, tissue reaction, nerve injury and allergic reaction were discussed. The approach was demonstrated using models and literature was provided. Verbal and written consent was obtained. My impressions and treatment recommendations were discussed in detail with the patient who verbalized understanding and had no further questions. Discharge instructions were provided. I personally saw and examined the patient and I agree with the above discussed plan of care.     New Medications Ordered This Visit   Medications    lisinopril-hydrochlorothiazide (PRINZIDE,ZESTORETIC) 20-12.5 MG per tablet     Sig: Take 1 tablet by mouth daily    gabapentin (NEURONTIN) 300 mg capsule     Sig: Take 1 capsule (300 mg total) by mouth 3 (three) times a day     Dispense:  90 capsule     Refill:  2       History of Present Illness    Carolina Márquez is a 37 y.o. female with pmhx of HTN, presenting with chronic right-sided neck pain described primarily as radicular nature since suffering work related injury in March of 2023. The pain radiates in the right C4 and C5 dermatomal distributions and is primarily right sided. The pain contributes to significant disability in participation with independent activities of daily living and is accompanied by weakness numbness and paresthesias that are debilitating in nature. The patient describes that overhead maneuvers such as combing hair is significantly limiting with respect to strength in the right arm/hand. The patient has been to physical therapy with modest benefit. She has trialed conservative measures including naproxen and gabapentin for the pain but has not trialed any steroids. She has never had interventional pain procedures in the past including any cervical interlaminar epidural steroid injections in the past for this pain; injections in right shoulder did not help significantly. Denies any gait abnormality, saddle anesthesia or bowel/bladder abnormality. I have personally reviewed and/or updated the patient's past medical history, past surgical history, family history, social history, current medications, allergies, and vital signs today. Review of Systems   Constitutional:  Positive for activity change. HENT: Negative. Eyes: Negative. Respiratory: Negative. Cardiovascular: Negative. Gastrointestinal: Negative. Endocrine: Negative. Genitourinary: Negative. Musculoskeletal:  Positive for arthralgias, back pain, myalgias, neck pain and neck stiffness. Negative for gait problem. Skin: Negative. Allergic/Immunologic: Negative. Neurological:  Positive for weakness and numbness. Hematological: Negative. Psychiatric/Behavioral: Negative. All other systems reviewed and are negative.       Patient Active Problem List   Diagnosis    Cervical radiculopathy - Right       Past Medical History:   Diagnosis Date Hypertension        Past Surgical History:   Procedure Laterality Date    LAPAROSCOPY      endometriosis       History reviewed. No pertinent family history. Social History     Occupational History    Not on file   Tobacco Use    Smoking status: Never    Smokeless tobacco: Never   Vaping Use    Vaping Use: Never used   Substance and Sexual Activity    Alcohol use: Never    Drug use: Never    Sexual activity: Not on file       Current Outpatient Medications on File Prior to Visit   Medication Sig    acetaminophen (TYLENOL) 325 mg tablet Take 650 mg by mouth every 6 (six) hours as needed for mild pain    lisinopril (ZESTRIL) 20 mg tablet Take 20 mg by mouth daily    lisinopril-hydrochlorothiazide (PRINZIDE,ZESTORETIC) 20-12.5 MG per tablet Take 1 tablet by mouth daily (Patient not taking: Reported on 11/1/2023)     No current facility-administered medications on file prior to visit. Allergies   Allergen Reactions    Corylus Itching    Proanthocyanidin Swelling    Shrimp Extract Allergy Skin Test - Food Allergy Hives    Apple Allergy Skin Test - Food Allergy Facial Swelling    Ibuprofen Swelling    Penicillins Edema     Physical Exam    /78 (BP Location: Left arm, Patient Position: Sitting, Cuff Size: Adult)   Pulse 76   Temp 97.6 °F (36.4 °C)   Ht 5' (1.524 m)   Wt 60.3 kg (133 lb)   SpO2 98%   BMI 25.97 kg/m²     Constitutional: normal, well developed, well nourished, alert, in no distress and non-toxic and no overt pain behavior. Eyes: anicteric  HEENT: grossly intact  Neck: supple, symmetric, trachea midline and no masses   Pulmonary:even and unlabored  Cardiovascular:No edema or pitting edema present  Skin:Normal without rashes or lesions and well hydrated  Psychiatric:Mood and affect appropriate  Neurologic:Cranial Nerves II-XII grossly intact Sensation grossly intact; no clonus negative carmen's. Reflexes 2+ and brisk. Spurling's maneuver positive right sided  Musculoskeletal:normal gait. 5/5 strength bilaterally with AROM in upper extremities. Significant pain with cervical facet loading bilaterally and with lateral spine rotation, ttp over cervical paraspinal muscles, right greater than left. Negative phoebe's test, negative gaenslen's negative SIJ loading bilaterally. Imaging    MRI cervical spine noncontrast:     MRI imaging Scattered multilevel spondylotic changes most pronounced at C4-5 eccentrically to the right contributing to right sided neural foraminal stenosis. At lower levels stenosis in transforaminal region more so on the left. No cord compression or cord signal abnormality.

## 2023-11-02 ENCOUNTER — OFFICE VISIT (OUTPATIENT)
Age: 43
End: 2023-11-02
Payer: OTHER MISCELLANEOUS

## 2023-11-02 DIAGNOSIS — G89.29 CHRONIC RIGHT SHOULDER PAIN: ICD-10-CM

## 2023-11-02 DIAGNOSIS — M75.21 BICIPITAL TENDINITIS OF RIGHT SHOULDER: ICD-10-CM

## 2023-11-02 DIAGNOSIS — S46.911D STRAIN OF RIGHT SHOULDER, SUBSEQUENT ENCOUNTER: ICD-10-CM

## 2023-11-02 DIAGNOSIS — M54.2 CERVICALGIA: ICD-10-CM

## 2023-11-02 DIAGNOSIS — M25.511 CHRONIC RIGHT SHOULDER PAIN: ICD-10-CM

## 2023-11-02 DIAGNOSIS — M54.2 NECK PAIN: Primary | ICD-10-CM

## 2023-11-02 DIAGNOSIS — M67.819 TENDINOSIS OF ROTATOR CUFF: ICD-10-CM

## 2023-11-02 PROCEDURE — 97112 NEUROMUSCULAR REEDUCATION: CPT | Performed by: PHYSICAL THERAPIST

## 2023-11-02 PROCEDURE — 97110 THERAPEUTIC EXERCISES: CPT | Performed by: PHYSICAL THERAPIST

## 2023-11-02 NOTE — PROGRESS NOTES
Daily Note     Today's date: 2023  Patient name: Landon Severance  : 1980  MRN: [id number]  Referring provider: Rica Cifuentes MD  Dx:   Encounter Diagnosis     ICD-10-CM    1. Neck pain  M54.2       2. Cervicalgia  M54.2       3. Tendinosis of rotator cuff  M67.819       4. Chronic right shoulder pain  M25.511     G89.29       5. Strain of right shoulder, subsequent encounter  S46.738D       6. Bicipital tendinitis of right shoulder  M75.21           Start Time: 845  Stop Time: 930  Total time in clinic (min): 45 minutes    Subjective: States she is out of work until 23. She is also scheduled for a Cx NOLAN the same day. Shoulder improving slowly, but still weak. Objective: See treatment diary below      Assessment: pt demonstrates imporved UE strength compared to last week with improved activity tolerance and decreased pain with reach/carry tasks. IR ROM improving, but remains painful. Completed resisted PNF diagonals on R to improve functional reach carry tasks. Postural mm activation exercises expanded per flowsheet below. Plan: Continue per plan of care. Progress treatment as tolerated.        Precautions: none     Daily Treatment Diary:      Initial Evaluation Date: 23  Compliance 9/21  9/26  9/28  10/3  10/5  10/17  10/19  10/24  11/2     Visit Number 1 2  3  4  5  6  7  8  9     Re-Eval  IE                 [facility]   Foto Captured y                           9/21  9/26  9/28  10/3  10/5  10/17  10/19  10/24  11/2     Manual                      Scap glide/distraction   8'  10'  10'  10'  8'  5'  5'       CTJ HVLAT   5'  5'     5'  5'  5'       K Tape Lower Trap   5'                   Ther-Ex                      Thoracic Ext 3x10  3x10  3x10  3x10  3x10  3x10  3x10  3x10  3x10     Thoracic ROT x10 (B)  x15 (B) x15 (B)  x15 (B)  x15 (B)  x15 (B)  x15 (B)  x15 (B)  x15 (B)     5-way shoulder ISO 5x5" ea  5x5" ea  5x5" ea  5x5" ea  5x5" ea  5x5" ea  5x5" ea  D/C       (B) Banded ER x10 5"  NR                   Pec minor stretch c 1/2 roll   4'  4'  4' 4'  4'  4'  D/C       Seated UT stretch    3' 3' 3' 3' 3' 3'    IR stretch c strap    4x30" 4x30" 4x30" 4x30" 4x30" 4x30"    Shoulder flexion c cane  x20  x20  x20  x20  x20  x20  Sup Full Arc  1# 2x20  Sup Full Arc  1# 2x20     Banded Row     Blk 3x15 Blk 3x15  Blk 3x15  10#  3x15  10#  3x15  10#  3x15  10#  3x15     Scap    2# 2x10 5" ecc 2# 2x10 5" ecc  2# 2x10 5" ecc  2# 2x10 5" ecc  2# 2x10 5" ecc  2# 2x10 5" ecc  2# 3x10 5" ecc                           Edu  5"                     Neuro Re-Ed                      (B) Banded ER   Grn 2x10 5"  Grn 2x10 5"    Grn 2x10 5"  Grn 2x10 5"  Grn 2x10 5"  Grn 2x10 5"  Grn 2x10 5"     Prone Scap Ret  10x10" 10x10"  10x10" 10x10" 10x10" 10x10" 10x10"    PNF D2 MRE       3x10 3x10 3x10                              Ther-Act                                                               Modalities

## 2023-11-13 ENCOUNTER — OFFICE VISIT (OUTPATIENT)
Age: 43
End: 2023-11-13
Payer: OTHER MISCELLANEOUS

## 2023-11-13 DIAGNOSIS — M54.2 NECK PAIN: Primary | ICD-10-CM

## 2023-11-13 DIAGNOSIS — G89.29 CHRONIC RIGHT SHOULDER PAIN: ICD-10-CM

## 2023-11-13 DIAGNOSIS — M54.2 CERVICALGIA: ICD-10-CM

## 2023-11-13 DIAGNOSIS — M75.21 BICIPITAL TENDINITIS OF RIGHT SHOULDER: ICD-10-CM

## 2023-11-13 DIAGNOSIS — M67.819 TENDINOSIS OF ROTATOR CUFF: ICD-10-CM

## 2023-11-13 DIAGNOSIS — S46.911D STRAIN OF RIGHT SHOULDER, SUBSEQUENT ENCOUNTER: ICD-10-CM

## 2023-11-13 DIAGNOSIS — M25.511 CHRONIC RIGHT SHOULDER PAIN: ICD-10-CM

## 2023-11-13 PROCEDURE — 97110 THERAPEUTIC EXERCISES: CPT | Performed by: PHYSICAL THERAPIST

## 2023-11-13 PROCEDURE — 97112 NEUROMUSCULAR REEDUCATION: CPT | Performed by: PHYSICAL THERAPIST

## 2023-11-13 NOTE — PROGRESS NOTES
PT Re-Evaluation     Today's date: 11/15/2023  Patient name: Naida Patel  : 1980  MRN: [id number]  Referring provider: Elvin Lee MD  Dx:   Encounter Diagnosis     ICD-10-CM    1. Neck pain  M54.2       2. Strain of right shoulder, subsequent encounter  S46.911D       3. Bicipital tendinitis of right shoulder  M75.21       4. Cervicalgia  M54.2       5. Tendinosis of rotator cuff  M67.819       6. Chronic right shoulder pain  M25.511     G89.29           Start Time: 815  Stop Time: 904  Total time in clinic (min): 49 minutes    Assessment  Assessment details: Naida Patel has been seen for 10 sessions of PT related to neck and R shoulder pain. They have improved with: Cx ROM and assoc mm spasm. They remain limited with: R shoulder pain and weakness. Special tests continue to indicate possible labral involvement in R shoulder. Current FOTO outcome measure score is 41, compared to 45 at IE. They would benefit from continued course of PT to meet all established goals and facilitate return to PLOF. Impairments: abnormal muscle tone, abnormal or restricted ROM, impaired physical strength, lacks appropriate home exercise program, pain with function and scapular dyskinesis    Symptom irritability: moderateUnderstanding of Dx/Px/POC: good   Prognosis: good    Goals  Short Term Functional Goals: In 4 weeks patient will:   Decrease R shoulder pain to 4 on a scale of 0-10 to allow turning head and performing ADL. Increase AROM of R shoulder 50% improved symmetry compared to other side for performance of reach/carry tasks. Patient will demonstrate 50% independence and compliance with HEP to maximize improvements in functional mobility. Patient will demonstrate independence with proper posture, body mechanics, self pain management, and ADL modification. pt will increase FOTO score by 10pts to reflect a statistically significant improvement.        Long Term Functional Goals (Goals for patient at discharge): In 6 weeks patient will:   Decrease R shoulder pain to 2 on a scale of 0-10 to allow RTW s limitations. Increase AROM of R shoulder to symmetrical c other shoulder for performance of reach/carry tasks. pt will score at or above predicted discharge FOTO score of 56 to reflect improvement in subjective functional ability. Plan  Patient would benefit from: skilled physical therapy  Referral necessary: No  Planned modality interventions: cryotherapy and thermotherapy: hydrocollator packs  Planned therapy interventions: manual therapy, neuromuscular re-education, patient education, therapeutic activities, therapeutic exercise and home exercise program  Frequency: 2x week  Duration in weeks: 6  Plan of Care beginning date: 2023  Plan of Care expiration date: 2023  Treatment plan discussed with: patient        Subjective Evaluation    History of Present Illness  Date of onset: 3/6/2023  Mechanism of injury:  Pt reports being about 25% back to the desired level of function at this time. Neck symp have improved in sharpness and notes less mm spasm. R Shoulder pain persists and limits lifting and pulling. States she is distressed due to not working for the last month. 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 Sees pain management this Thursday and shoulder surgeon Friday.              Not a recurrent problem   Quality of life: good    Patient Goals  Patient goals for therapy: decreased pain, increased motion, independence with ADLs/IADLs, increased strength and return to work    Pain  Current pain ratin  At best pain rating: 3  At worst pain ratin  Quality: dull ache, radiating, tight and sharp  Relieving factors: ice and medications  Aggravating factors: overhead activity and lifting  Progression: no change    Social Support    Employment status: working (modified duty)  Hand dominance: right    Treatments  Previous treatment: injection treatment        Objective     Palpation     Right   Tenderness of the upper trapezius. Tenderness     Right Shoulder  Tenderness in the biceps tendon (proximal) and supraspinatus tendon. No tenderness in the medial scapula. Active Range of Motion   Cervical/Thoracic Spine       Cervical    Flexion: 45 degrees   Extension: 45 degrees     with pain  Left lateral flexion: 30 degrees      Right lateral flexion: 30 degrees      Left rotation: 55 degrees with pain  Right rotation: 55 degrees     Left Shoulder   Flexion: 160 degrees   Abduction: 165 degrees   External rotation BTH: T4   Internal rotation BTB: T8     Right Shoulder   Flexion: 145 degrees with pain  Abduction: 120 degrees with pain  External rotation BTH: T2   Internal rotation BTB: L2     Passive Range of Motion     Right Shoulder   Flexion: 145 degrees   Abduction: 115 degrees   External rotation 90°: 80 degrees   Internal rotation 90°: 20 degrees     Strength/Myotome Testing     Left Shoulder   Normal muscle strength    Right Shoulder     Planes of Motion   Flexion: 4-   Abduction: 3+   External rotation at 90°: 3+   Internal rotation at 90°: 3+     Tests     Right Shoulder   Positive active compression ([place]), Hawkin's, horn blower, painful arc and bicep load .        Flowsheet Rows      Flowsheet Row Most Recent Value   PT/OT G-Codes    Current Score 41   Projected Score 56               Precautions: none     Daily Treatment Diary:      Initial Evaluation Date: 09/21/23  Compliance 9/21  9/26  9/28  10/3  10/5  10/17  10/19  10/24  11/2  11/13   Visit Number 1 2  3  4  5  6  7  8  9  10   Re-Eval  IE                  Y   Foto Captured y                  Y              9/21  9/26  9/28  10/3  10/5  10/17  10/19  10/24  11/2  11/13   Manual                       Scap glide/distraction    8'  10'  10'  10'  8'  5'  5'       CTJ HVLAT    5'  5'      5'  5'  5'       K Tape Lower Trap    5'                   Ther-Ex Thoracic Ext 3x10  3x10  3x10  3x10  3x10  3x10  3x10  3x10  3x10  3x10   Thoracic ROT x10 (B)  x15 (B) x15 (B)  x15 (B)  x15 (B)  x15 (B)  x15 (B)  x15 (B)  x15 (B)  x15 (B)   5-way shoulder ISO 5x5" ea  5x5" ea  5x5" ea  5x5" ea  5x5" ea  5x5" ea  5x5" ea  D/C       (B) Banded ER x10 5"  NR                   Pec minor stretch c 1/2 roll    4'  4'  4' 4'  4'  4'  D/C       Seated UT stretch       3' 3' 3' 3' 3' 3'  3'   IR stretch c strap       4x30" 4x30" 4x30" 4x30" 4x30" 4x30"  4x30"   Shoulder flexion c cane   x20  x20  x20  x20  x20  x20  Sup Full Arc  1# 2x20  Sup Full Arc  1# 2x20  Sup Full Arc  1# 2x20   Banded Row      Blk 3x15 Blk 3x15  Blk 3x15  10#  3x15  10#  3x15  10#  3x15  10#  3x15  10#  3x15   Scap     2# 2x10 5" ecc 2# 2x10 5" ecc  2# 2x10 5" ecc  2# 2x10 5" ecc  2# 2x10 5" ecc  2# 2x10 5" ecc  2# 3x10 5" ecc  2# 3x10 5" ecc                           Edu  5"                     Neuro Re-Ed                       (B) Banded ER    Grn 2x10 5"  Grn 2x10 5"    Grn 2x10 5"  Grn 2x10 5"  Grn 2x10 5"  Grn 2x10 5"  Grn 2x10 5"  Grn 2x10 5"   Prone Scap Ret   10x10" 10x10"   10x10" 10x10" 10x10" 10x10" 10x10"  10x10"   PNF D2 MRE             3x10 3x10 3x10  3x10    Ball on wall CW/CCW                    2x20 ea                           Ther-Act                                                                                                Modalities

## 2023-11-13 NOTE — PROGRESS NOTES
Daily Note     Today's date: 2023  Patient name: Bret Menjivar  : 1980  MRN: [id number]  Referring provider: Aruna Heller MD  Dx:   No diagnosis found. Subjective: States she is out of work until 23. She is also scheduled for a Cx NOLAN the same day. Shoulder improving slowly, but still weak. Objective: See treatment diary below      Assessment: pt demonstrates imporved UE strength compared to last week with improved activity tolerance and decreased pain with reach/carry tasks. IR ROM improving, but remains painful. Completed resisted PNF diagonals on R to improve functional reach carry tasks. Postural mm activation exercises expanded per flowsheet below. Plan: Continue per plan of care. Progress treatment as tolerated.        Precautions: none     Daily Treatment Diary:      Initial Evaluation Date: 23  Compliance 9/21  9/26  9/28  10/3  10/5  10/17  10/19  10/24  11/2     Visit Number 1 2  3  4  5  6  7  8  9     Re-Eval  IE                 [facility]   Foto Captured y                           9/21  9/26  9/28  10/3  10/5  10/17  10/19  10/24  11/2     Manual                      Scap glide/distraction   8'  10'  10'  10'  8'  5'  5'       CTJ HVLAT   5'  5'     5'  5'  5'       K Tape Lower Trap   5'                   Ther-Ex                      Thoracic Ext 3x10  3x10  3x10  3x10  3x10  3x10  3x10  3x10  3x10     Thoracic ROT x10 (B)  x15 (B) x15 (B)  x15 (B)  x15 (B)  x15 (B)  x15 (B)  x15 (B)  x15 (B)     5-way shoulder ISO 5x5" ea  5x5" ea  5x5" ea  5x5" ea  5x5" ea  5x5" ea  5x5" ea  D/C       (B) Banded ER x10 5"  NR                   Pec minor stretch c 1/2 roll   4'  4'  4' 4'  4'  4'  D/C       Seated UT stretch    3' 3' 3' 3' 3' 3'    IR stretch c strap    4x30" 4x30" 4x30" 4x30" 4x30" 4x30"    Shoulder flexion c cane  x20  x20  x20  x20  x20  x20  Sup Full Arc  1# 2x20  Sup Full Arc  1# 2x20     Banded Row     Blk 3x15 Blk 3x15  Blk 3x15  10#  3x15 10#  3x15  10#  3x15  10#  3x15     Scap    2# 2x10 5" ecc 2# 2x10 5" ecc  2# 2x10 5" ecc  2# 2x10 5" ecc  2# 2x10 5" ecc  2# 2x10 5" ecc  2# 3x10 5" ecc                           Edu  5"                     Neuro Re-Ed                      (B) Banded ER   Grn 2x10 5"  Grn 2x10 5"    Grn 2x10 5"  Grn 2x10 5"  Grn 2x10 5"  Grn 2x10 5"  Grn 2x10 5"     Prone Scap Ret  10x10" 10x10"  10x10" 10x10" 10x10" 10x10" 10x10"    PNF D2 MRE       3x10 3x10 3x10                              Ther-Act                                                               Modalities

## 2023-11-16 ENCOUNTER — APPOINTMENT (OUTPATIENT)
Dept: RADIOLOGY | Facility: HOSPITAL | Age: 43
End: 2023-11-16
Payer: OTHER MISCELLANEOUS

## 2023-11-16 ENCOUNTER — HOSPITAL ENCOUNTER (OUTPATIENT)
Facility: HOSPITAL | Age: 43
Setting detail: OUTPATIENT SURGERY
Discharge: HOME | End: 2023-11-16
Attending: ANESTHESIOLOGY | Admitting: ANESTHESIOLOGY
Payer: OTHER MISCELLANEOUS

## 2023-11-16 VITALS
SYSTOLIC BLOOD PRESSURE: 114 MMHG | WEIGHT: 133 LBS | OXYGEN SATURATION: 99 % | TEMPERATURE: 97.6 F | RESPIRATION RATE: 18 BRPM | HEART RATE: 63 BPM | DIASTOLIC BLOOD PRESSURE: 63 MMHG | BODY MASS INDEX: 26.11 KG/M2 | HEIGHT: 60 IN

## 2023-11-16 LAB
EXT PREGNANCY TEST URINE: NEGATIVE
EXT. CONTROL: NORMAL

## 2023-11-16 PROCEDURE — 81025 URINE PREGNANCY TEST: CPT | Performed by: ANESTHESIOLOGY

## 2023-11-16 PROCEDURE — 62321 NJX INTERLAMINAR CRV/THRC: CPT | Performed by: ANESTHESIOLOGY

## 2023-11-16 PROCEDURE — A9585 GADOBUTROL INJECTION: HCPCS | Performed by: ANESTHESIOLOGY

## 2023-11-16 RX ORDER — METHYLPREDNISOLONE ACETATE 80 MG/ML
INJECTION, SUSPENSION INTRA-ARTICULAR; INTRALESIONAL; INTRAMUSCULAR; SOFT TISSUE AS NEEDED
Status: DISCONTINUED | OUTPATIENT
Start: 2023-11-16 | End: 2023-11-16 | Stop reason: HOSPADM

## 2023-11-16 RX ORDER — LIDOCAINE HYDROCHLORIDE 10 MG/ML
INJECTION, SOLUTION EPIDURAL; INFILTRATION; INTRACAUDAL; PERINEURAL AS NEEDED
Status: DISCONTINUED | OUTPATIENT
Start: 2023-11-16 | End: 2023-11-16 | Stop reason: HOSPADM

## 2023-11-16 RX ORDER — SODIUM CHLORIDE 9 MG/ML
INJECTION INTRAVENOUS AS NEEDED
Status: DISCONTINUED | OUTPATIENT
Start: 2023-11-16 | End: 2023-11-16 | Stop reason: HOSPADM

## 2023-11-16 NOTE — INTERVAL H&P NOTE
H&P reviewed. After examining the patient I find no changes in the patients condition since the H&P had been written.     Vitals:    11/16/23 0814   BP: 134/66   Pulse: 78   Resp: 18   Temp: 97.5 °F (36.4 °C)   SpO2: 100%

## 2023-11-16 NOTE — DISCHARGE INSTR - AVS FIRST PAGE
YOUR 2 WEEK FOLLOW UP HAS BEEN SCHEDULED; IF YOU WISH TO CHANGE THE FOLLOW UP, PLEASE CALL THE [facility] AT [place]: [phone]    Epidural Steroid Injection   WHAT YOU NEED TO KNOW:   An epidural steroid injection (NOLAN) is a procedure to inject steroid medicine into the epidural space. The epidural space is between your spinal cord and vertebrae. Steroids reduce inflammation and fluid buildup in your spine that may be causing pain. You may be given pain medicine along with the steroids. DISCHARGE INSTRUCTIONS:   Call your local emergency number (911 in the [address]) if:   You have a seizure. You have trouble moving your legs. Seek care immediately if:   Blood soaks through your bandage. You have a fever or chills, severe back pain, and the procedure area is sensitive to the touch. You cannot control when you urinate or have a bowel movement. Call your doctor if:   You have weakness or numbness in your legs. Your wound is red, swollen, or draining pus. You have nausea or are vomiting. Your face or neck is red and you feel warm. You have more pain than you had before the procedure. You have swelling in your hands or feet. You have questions or concerns about your condition or care. Care for your wound as directed: You may remove the bandage before you go to bed the day of your procedure. You may take a shower, but do not take a bath for at least 24 hours. Self-care:   Do not drive,  use machines, or do strenuous activity for 24 hours after your procedure or as directed. Continue other treatments  as directed. Steroid injections alone will not control your pain. The injections are meant to be used with other treatments, such as physical therapy. Follow up with your doctor as directed:  Write down your questions so you remember to ask them during your visits.      EPIDURAL STEROID INJECTION DISCHARGE INSTRUCTIONS      ACTIVITY  Do not drive or operate machinery today. No strenuous activity today - bending, lifting, etc.   You may resume normal activities starting tomorrow - start slowly and as tolerated. You may shower today, but not tub baths or hot tubs. You may have numbness for several hours from the local anesthetics. Please use caution and common sense, especially with weight-bearing activities. CARE OF THE INJECTION SITE  If you have soreness or pain apply ice to the area today (20 minutes on and 20 minutes off). Starting tomorrow, you   Notify the [facility] if you have any of the following: redness, drainage, swelling or fever above 100°F.    SPECIAL INSTRUCTIONS  Please return the [facility] diary to our office by mail, fax, or drop it off. MEDICATIONS  Please do not take any break through or short acting pain medications for 8 hours after the block. Continue to take all routine medications. Our office may have instructed you to hold some medications.   You may resume ______

## 2023-11-16 NOTE — PROCEDURES
Pre-procedure Diagnosis: Cervical Radiculopathy  Post-procedure Diagnosis: Cervical Radiculopathy  Procedure Title(s):  1. C6-C7 interlaminar epidural steroid injection      2. Intraoperative fluoroscopy  Attending Surgeon:   Patrick Prasad MD  Anesthesia:   Local     Indications: The patient is a 37y.o. year-old female with a diagnosis of Cervical Radiculopathy. The patient's history and physical exam were reviewed. The risks, benefits and alternatives to the procedure were discussed, and all questions were answered to the patient's satisfaction. The patient agreed to proceed, and written informed consent was obtained. Procedure in Detail: The patient was brought into the procedure room and placed in the prone position on the fluoroscopy table. The area of the cervical spine was prepped with chlorhexidine gluconate solution times one and draped in a sterile manner. The C6-C7 interspace was identified and marked under AP fluoroscopy. The skin and subcutaneous tissues in the area were anesthetized with 1% lidocaine. A 18-gauge Tuohy epidural needle was directed toward the interspace under fluoroscopic guidance until the ligamentum flavum was engaged. The C-arm was oblique to the right to obtain a contra-lateral oblique view. From this point, a loss of resistance technique with saline was used to identify entrance of the needle into the epidural space. Once an appropriate loss was obtained, negative aspiration was confirmed, and 1 ml GADVIST solution was injected. An appropriate epidurogram was noted. Then, after negative aspiration, a solution consisting of 1-mL depo-medrol (80mg/mL) and 3-mL preservative-free saline was easily injected. The needle was removed with a 1% lidocaine flush. The patient's back was cleaned and a bandage was placed over the site of needle insertion. Disposition: The patient tolerated the procedure well, and there were no apparent complications.  The patient was taken to the recovery area where written discharge instructions for the procedure were given.      Estimated Blood Loss: None  Specimens Obtained: N/A

## 2023-11-16 NOTE — OP NOTE
OPERATIVE REPORT  PATIENT NAME: Tommy Hoffmann    :  1980  MRN: [id number]  Pt Location:  GI ROOM 01    SURGERY DATE: 2023    Surgeon(s) and Role: Giovanni Prasad MD - Primary    Preop Diagnosis:  Cervical radiculopathy [M54.12]    Post-Op Diagnosis Codes:     * Cervical radiculopathy [M54.12]    Procedure(s):  BLOCK / INJECTION EPIDURAL STEROID CERVICAL C6-C7    Specimen(s):  * No specimens in log *    Estimated Blood Loss:   Minimal    Drains:  * No LDAs found *    Anesthesia Type:   Local    Operative Indications:  Cervical radiculopathy [M54.12]    Operative Findings:  C6-C7 epidurogram    Complications:   None    Procedure and Technique:  Please see detailed procedure note    I was present for the entire procedure.     Patient Disposition:  PACU     SIGNATURE: Giovanni Prasad MD  DATE: 2023  TIME: 8:51 AM

## 2023-11-17 ENCOUNTER — OFFICE VISIT (OUTPATIENT)
Dept: OBGYN CLINIC | Facility: CLINIC | Age: 43
End: 2023-11-17
Payer: OTHER MISCELLANEOUS

## 2023-11-17 VITALS
TEMPERATURE: 97.5 F | HEART RATE: 70 BPM | SYSTOLIC BLOOD PRESSURE: 120 MMHG | BODY MASS INDEX: 21.38 KG/M2 | HEIGHT: 66 IN | DIASTOLIC BLOOD PRESSURE: 80 MMHG | WEIGHT: 133 LBS

## 2023-11-17 DIAGNOSIS — G89.29 CHRONIC RIGHT SHOULDER PAIN: Primary | ICD-10-CM

## 2023-11-17 DIAGNOSIS — M67.819 TENDINOSIS OF ROTATOR CUFF: ICD-10-CM

## 2023-11-17 DIAGNOSIS — M25.511 CHRONIC RIGHT SHOULDER PAIN: Primary | ICD-10-CM

## 2023-11-17 PROCEDURE — 99213 OFFICE O/P EST LOW 20 MIN: CPT | Performed by: ORTHOPAEDIC SURGERY

## 2023-11-17 NOTE — LETTER
November 17, 2023     Patient: Tricia Jeronimo   YOB: 1980   Date of Visit: 11/17/2023       To Whom It May Concern: It is my medical opinion that Tricia Jeronimo may return to light duty immediately with the following restrictions: Using the right arm she has a 10 pound lifting restriction and not lift the right arm above shoulder level . If you have any questions or concerns, please don't hesitate to call.          Sincerely,        Fredi Velásquez PA-C, AT    CC: No Recipients

## 2023-11-17 NOTE — PATIENT INSTRUCTIONS
External capacity exam was ordered. She will follow-up in orthopedics after the exam.  Work restriction note provided.

## 2023-11-17 NOTE — PROGRESS NOTES
ASSESSMENT/PLAN:    Diagnoses and all orders for this visit:    Chronic right shoulder pain    Tendinosis of rotator cuff  -     Ambulatory Referral to Functional Capacity Evaluation; Future    Plan: The patient's lack of improvement with physical therapy was discussed. She had approximately a month or more of physical therapy during the first couple months after her injury and then has had 2 months of physical therapy since the latter part of September. At this time, she indicates that she does not feel any better than she did prior to initiating this most recent course of physical therapy. After thorough discussion, she does not have the ability to proceed with surgery secondary to personal circumstances. Therefore, I think she has essentially reached maximum medical benefit, barring surgical intervention. Therefore, a functional capacity exam was ordered. She will follow-up after the FCE is completed. She does understand that this would likely result in a long-term permanent activity level for work unless her decision to proceed with surgery were to change. She is to continue working in a limited duty capacity and a note was provided today. Return After functional capacity exam.  _____________________________________________________  CHIEF COMPLAINT:  Chief Complaint   Patient presents with    Right Shoulder - Follow-up    Follow-up     SUBJECTIVE:  Nessa Babb is a 37y.o. year old female who presents for follow up of right shoulder rotator cuff tendinitis partial-thickness tear supraspinatus tendon. Reports this is a work-related injury that started in March 2023. She is employed at [employer] She is right-hand dominant. She had physical therapy for a few weeks at [facility] and stopped. She eventually saw [facility] Dr. Matt Allred who restarted her physical therapy in September. Had two shoulder injections without improvement.   She just had a local injection by Dr. No Curtis yesterday. She continues to complain primarily anterior and lateral shoulder pain that goes into her back and neck area. 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 Denies gross numbness or tingling in the arm today. He notes that she is even having difficulty using the arm for daily routine activities and has to bring the arm forward in a unique way to get food to her mouth. Reports she is on light duty but still has to use the right arm work and at home causes pain. PAST MEDICAL HISTORY:  Past Medical History:   Diagnosis Date    Hypertension      PAST SURGICAL HISTORY:  Past Surgical History:   Procedure Laterality Date    EPIDURAL BLOCK INJECTION N/A 11/16/2023    Procedure: BLOCK / INJECTION EPIDURAL STEROID CERVICAL C6-C7;  Surgeon: Jinny Diaz MD;  [facility];  Service: Pain Management     LAPAROSCOPY      endometriosis     FAMILY HISTORY:  History reviewed. No pertinent family history.     SOCIAL HISTORY:  Social History     Tobacco Use    Smoking status: Never    Smokeless tobacco: Never   Vaping Use    Vaping Use: Never used   Substance Use Topics    Alcohol use: Never    Drug use: Never     MEDICATIONS:    Current Outpatient Medications:     acetaminophen (TYLENOL) 325 mg tablet, Take 650 mg by mouth every 6 (six) hours as needed for mild pain, Disp: , Rfl:     gabapentin (NEURONTIN) 300 mg capsule, Take 1 capsule (300 mg total) by mouth 3 (three) times a day, Disp: 90 capsule, Rfl: 2    lisinopril (ZESTRIL) 20 mg tablet, Take 20 mg by mouth daily, Disp: , Rfl:     lisinopril-hydrochlorothiazide (PRINZIDE,ZESTORETIC) 20-12.5 MG per tablet, Take 1 tablet by mouth daily (Patient not taking: Reported on 11/1/2023), Disp: , Rfl:     ALLERGIES:  Allergies   Allergen Reactions    Corylus Itching    Proanthocyanidin Swelling    Shrimp Extract Allergy Skin Test - Food Allergy Hives    Apple Allergy Skin Test - Food Allergy Facial Swelling    Ibuprofen Swelling    Penicillins Edema     REVIEW OF SYSTEMS:  Pertinent items are noted in HPI. A comprehensive review of systems was negative.  _____________________________________________________  PHYSICAL EXAMINATION:  General: well developed and well nourished, alert, and oriented times 3  Psychiatric: Normal  HEENT:  Normocephalic, atraumatic  Cardiovascular:  Regular  Pulmonary: No wheezing or stridor  Skin: No masses, erthema, lacerations, fluctation, ulcerations  Neurovascular: Grossly intact  MUSCULOSKELETAL EXAMINATION:    Right shoulder some tightness and discomfort in the upper trapezius region. Locates her area of primary shoulder discomfort more anteriorly does have some discomfort with resisted speeds test.  He has full forward flexion. This abduction to approximately 160 degrees ER 80 degrees. Length with ER 5/5 IR strength 5/5. She has mildly positive impingement sign. Slight weakness with subscap lift off test.  She has negative Jobes test, drop arm test and belly press.       Phyllistine Eligio

## 2023-11-24 ENCOUNTER — TELEPHONE (OUTPATIENT)
Dept: PAIN MEDICINE | Facility: CLINIC | Age: 43
End: 2023-11-24

## 2023-11-29 ENCOUNTER — OFFICE VISIT (OUTPATIENT)
Dept: PAIN MEDICINE | Facility: CLINIC | Age: 43
End: 2023-11-29
Payer: OTHER MISCELLANEOUS

## 2023-11-29 VITALS
DIASTOLIC BLOOD PRESSURE: 72 MMHG | TEMPERATURE: 97.7 F | WEIGHT: 132 LBS | HEIGHT: 60 IN | OXYGEN SATURATION: 99 % | BODY MASS INDEX: 25.91 KG/M2 | HEART RATE: 83 BPM | SYSTOLIC BLOOD PRESSURE: 102 MMHG

## 2023-11-29 DIAGNOSIS — M54.12 CERVICAL RADICULOPATHY: Primary | ICD-10-CM

## 2023-11-29 PROCEDURE — 99214 OFFICE O/P EST MOD 30 MIN: CPT | Performed by: ANESTHESIOLOGY

## 2023-11-29 RX ORDER — GABAPENTIN 300 MG/1
300 CAPSULE ORAL 3 TIMES DAILY
Qty: 90 CAPSULE | Refills: 2 | Status: SHIPPED | OUTPATIENT
Start: 2023-11-29

## 2023-11-29 NOTE — PROGRESS NOTES
Assessment  1. Cervical radiculopathy - Right    Minimal relief or improved ability to participate with IADLs after C6-C7 ILESI. Has yet to start gabapentin. Has completed formal PT. Previously reported the following symptomatology:     Right, greater than left sided cervical radicular pain in C4 and C5 dermatomal distribution accompanied by pain limited weakness numbness and paresthesias. Patient has been a full participant with PT. Chronic pain with decreased participation with IADLs over the past few years. Has been taking NSAIDs and tramadol infrequently with modest benefit. 5/5 strength bilaterally in upper extremities with AROM, negative spurling's maneuver,b/l. Additionally there is positive cervical facet loading eliciting pain, left greater than right. Negative Shrestha's, denies any gait instability, saddle anesthesia. On MRI imaging Scattered multilevel spondylotic changes most pronounced at C4-5 eccentrically to the right contributing to right sided neural foraminal stenosis. At lower levels stenosis in transforaminal region more so on the left. No cord compression or cord signal abnormality. Risks, benefits alternatives to epidural steroid injections thoroughly discussed with patient. Handouts provided questions answered to patient's satisfaction. Lifestyle modifications extensively discussed including sleep hygiene, neck posture, diet, exercise and weight loss in conjunction with PT. Will proceed with multimodal pain therapy plan as noted below:    Plan  -may call back to schedule C7-T1 ILESI or alternate level; f/u 2 weeks post procedure  -gabapentin 300 mg t.i.d. Ordered for patient; counseled regarding sedative effects of taking this medication and provided up titration calendar.   Counseled not to take medication while driving or operating heavy machinery/using stairs  -has completed formal physical therapy for right-sided cervical spondylosis/radiculopathy; Physician directed home exercise plan as per AAOS demonstrated and handouts provided that patient plans to participate with for 1 hour, twice a week for the next 6 weeks. There are risks associated with opioid medications, including dependence, addiction and tolerance. The patient understands and agrees to use these medications only as prescribed. Potential side effects of the medications include, but are not limited to, constipation, drowsiness, addiction, impaired judgment and risk of fatal overdose if not taken as prescribed. The patient was warned against driving while taking sedation medications or operating heavy machinery. The patient voiced understanding. Sharing medications is a felony. At this point in time, the patient is showing no signs of addiction, abuse, diversion or suicidal ideation. [place] Prescription Drug Monitoring Program report was reviewed and was appropriate      Complete risks and benefits including bleeding, infection, tissue reaction, nerve injury and allergic reaction were discussed. The approach was demonstrated using models and literature was provided. Verbal and written consent was obtained. My impressions and treatment recommendations were discussed in detail with the patient who verbalized understanding and had no further questions. Discharge instructions were provided. I personally saw and examined the patient and I agree with the above discussed plan of care. No orders of the defined types were placed in this encounter. History of Present Illness    Minimal relief or improved ability to participate with IADLs after C6-C7 ILESI. Has yet to start gabapentin. Has completed formal PT. Previously reported the following symptomatology:     Bharati De Leon is a 37 y.o. female with pmhx of HTN, presenting with chronic right-sided neck pain described primarily as radicular nature since suffering work related injury in March of 2023.   The pain radiates in the right C4 and C5 dermatomal distributions and is primarily right sided. The pain contributes to significant disability in participation with independent activities of daily living and is accompanied by weakness numbness and paresthesias that are debilitating in nature. The patient describes that overhead maneuvers such as combing hair is significantly limiting with respect to strength in the right arm/hand. The patient has been to physical therapy with modest benefit. She has trialed conservative measures including naproxen and gabapentin for the pain but has not trialed any steroids. She has never had interventional pain procedures in the past including any cervical interlaminar epidural steroid injections in the past for this pain; injections in right shoulder did not help significantly. Denies any gait abnormality, saddle anesthesia or bowel/bladder abnormality. I have personally reviewed and/or updated the patient's past medical history, past surgical history, family history, social history, current medications, allergies, and vital signs today. Review of Systems   Constitutional:  Positive for activity change. HENT: Negative. Eyes: Negative. Respiratory: Negative. Cardiovascular: Negative. Gastrointestinal: Negative. Endocrine: Negative. Genitourinary: Negative. Musculoskeletal:  Positive for arthralgias, back pain, myalgias, neck pain and neck stiffness. Negative for gait problem. Skin: Negative. Allergic/Immunologic: Negative. Neurological:  Positive for weakness and numbness. Hematological: Negative. Psychiatric/Behavioral: Negative. All other systems reviewed and are negative.       Patient Active Problem List   Diagnosis    Cervical radiculopathy - Right       Past Medical History:   Diagnosis Date    Hypertension        Past Surgical History:   Procedure Laterality Date    EPIDURAL BLOCK INJECTION N/A 11/16/2023    Procedure: BLOCK / INJECTION EPIDURAL STEROID CERVICAL C6-C7; Surgeon: Caitie Lucero MD;  Location: [facility];  Service: Pain Management     LAPAROSCOPY      endometriosis       History reviewed. No pertinent family history. Social History     Occupational History    Not on file   Tobacco Use    Smoking status: Never    Smokeless tobacco: Never   Vaping Use    Vaping Use: Never used   Substance and Sexual Activity    Alcohol use: Never    Drug use: Never    Sexual activity: Not on file       Current Outpatient Medications on File Prior to Visit   Medication Sig    acetaminophen (TYLENOL) 325 mg tablet Take 650 mg by mouth every 6 (six) hours as needed for mild pain    lisinopril-hydrochlorothiazide (PRINZIDE,ZESTORETIC) 20-12.5 MG per tablet Take 1 tablet by mouth daily    gabapentin (NEURONTIN) 300 mg capsule Take 1 capsule (300 mg total) by mouth 3 (three) times a day (Patient not taking: Reported on 11/29/2023)    lisinopril (ZESTRIL) 20 mg tablet Take 20 mg by mouth daily (Patient not taking: Reported on 11/29/2023)     No current facility-administered medications on file prior to visit. Allergies   Allergen Reactions    Corylus Itching    Nuts - Food Allergy Anaphylaxis    Proanthocyanidin Swelling    Shrimp Extract Allergy Skin Test - Food Allergy Hives    Apple Allergy Skin Test - Food Allergy Facial Swelling    Ibuprofen Swelling    Penicillins Edema     Physical Exam    /72 (BP Location: Left arm, Patient Position: Sitting, Cuff Size: Adult)   Pulse 83   Temp 97.7 °F (36.5 °C)   Ht 5' (1.524 m)   Wt 59.9 kg (132 lb)   LMP 11/13/2023   SpO2 99%   BMI 25.78 kg/m²     Constitutional: normal, well developed, well nourished, alert, in no distress and non-toxic and no overt pain behavior.   Eyes: anicteric  HEENT: grossly intact  Neck: supple, symmetric, trachea midline and no masses   Pulmonary:even and unlabored  Cardiovascular:No edema or pitting edema present  Skin:Normal without rashes or lesions and well hydrated  Psychiatric:Mood and affect appropriate  Neurologic:Cranial Nerves II-XII grossly intact Sensation grossly intact; no clonus negative carmen's. Reflexes 2+ and brisk. Spurling's maneuver positive right sided  Musculoskeletal:normal gait. 5/5 strength bilaterally with AROM in upper extremities. Significant pain with cervical facet loading bilaterally and with lateral spine rotation, ttp over cervical paraspinal muscles, right greater than left. Negative phoebe's test, negative gaenslen's negative SIJ loading bilaterally. Imaging    MRI cervical spine noncontrast:     MRI imaging Scattered multilevel spondylotic changes most pronounced at C4-5 eccentrically to the right contributing to right sided neural foraminal stenosis. At lower levels stenosis in transforaminal region more so on the left. No cord compression or cord signal abnormality.

## 2023-11-29 NOTE — PATIENT INSTRUCTIONS
Neck Exercises   AMBULATORY CARE:   Neck exercises  help reduce neck pain, and improve neck movement and strength. Neck exercises also help prevent long-term neck problems. Call your doctor if:   Your pain does not get better, or gets worse. You have questions or concerns about your condition, care, or exercise program.    What you need to know about exercise safety:   Move slowly, gently, and smoothly. Avoid fast or jerky motions. Stand and sit the way your healthcare provider shows you. Good posture may reduce your neck pain. Check your posture often, even when you are not doing your neck exercises. Follow the exercise program recommended by your healthcare provider. He or she will tell you which exercises are best for your condition. He or she will also tell you how many repetitions to do and how often you should do the exercises. How to perform neck exercises safely:   Exercise position:  You may sit or stand while you do neck exercises. Face forward. Your shoulders should be straight and relaxed, with a good posture. Head tilts, forward and back:  Gently bow your head and try to touch your chin to your chest. Your healthcare provider may tell you to push on the back of your neck to help bow your head. Raise your chin back to the starting position. Tilt your head back as far as possible so you are looking up at the ceiling. Your healthcare provider may tell you to lift your chin to help tilt your head back. Return your head to the starting position. Head tilts, side to side:  Tilt your head, bringing your ear toward your shoulder. Then tilt your head toward the other shoulder. Head turns:  Turn your head to look over your shoulder. Tilt your chin down and try to touch it to your shoulder. Do not raise your shoulder to your chin. Face forward again. Do the same on the other side.          Head rolls:  Slowly bring your chin toward your chest. Next, roll your head to the right. Your ear should be positioned over your shoulder. Hold this position for 5 seconds. Roll your head back toward your chest and to the left into the same position. Hold for 5 seconds. Gently roll your head back and around in a clockwise [place] 3 times. Next, move your head in the reverse direction (counterclockwise) in a [place] 3 times. Do not shrug your shoulders upwards while you do this exercise. Follow up with your doctor as directed:  Write down your questions so you remember to ask them during your visits. © Copyright [facility] 2023 Information is for End User's use only and may not be sold, redistributed or otherwise used for commercial purposes. The above information is an  only. It is not intended as medical advice for individual conditions or treatments. Talk to your doctor, nurse or pharmacist before following any medical regimen to see if it is safe and effective for you.

## 2023-11-30 ENCOUNTER — OFFICE VISIT (OUTPATIENT)
Age: 43
End: 2023-11-30
Payer: OTHER MISCELLANEOUS

## 2023-11-30 DIAGNOSIS — M67.819 TENDINOSIS OF ROTATOR CUFF: ICD-10-CM

## 2023-11-30 DIAGNOSIS — M54.2 CERVICALGIA: ICD-10-CM

## 2023-11-30 DIAGNOSIS — M75.21 BICIPITAL TENDINITIS OF RIGHT SHOULDER: ICD-10-CM

## 2023-11-30 DIAGNOSIS — M54.2 NECK PAIN: Primary | ICD-10-CM

## 2023-11-30 DIAGNOSIS — M25.511 CHRONIC RIGHT SHOULDER PAIN: ICD-10-CM

## 2023-11-30 DIAGNOSIS — S46.911D STRAIN OF RIGHT SHOULDER, SUBSEQUENT ENCOUNTER: ICD-10-CM

## 2023-11-30 DIAGNOSIS — G89.29 CHRONIC RIGHT SHOULDER PAIN: ICD-10-CM

## 2023-11-30 PROCEDURE — 97112 NEUROMUSCULAR REEDUCATION: CPT | Performed by: PHYSICAL THERAPIST

## 2023-11-30 PROCEDURE — 97110 THERAPEUTIC EXERCISES: CPT | Performed by: PHYSICAL THERAPIST

## 2023-11-30 NOTE — PROGRESS NOTES
Daily Note     Today's date: 2023  Patient name: Omid Castañeda  : 1980  MRN: [id number]  Referring provider: Perla Rivas MD  Dx:   Encounter Diagnosis     ICD-10-CM    1. Neck pain  M54.2       2. Strain of right shoulder, subsequent encounter  S46.911D       3. Bicipital tendinitis of right shoulder  M75.21       4. Cervicalgia  M54.2       5. Chronic right shoulder pain  M25.511     G89.29       6. Tendinosis of rotator cuff  M67.819           Start Time:   Stop Time:   Total time in clinic (min): 55 minutes    Subjective: pt underwent Cx NOLAN c limited relief. Referred by orhto for FCE. Still pain with resisted OH reaching. Objective: See treatment diary below  Flex/ABD - 150*; ER - T5; IR - T10    Assessment: Tolerated treatment fair. Emphasis placed on avoiding compensatory movement and proper reaching sequencing. TrP noted (B) levator scapula and motion imrpvoed following tack and stretch. Patient demonstrated fatigue post treatment, exhibited good technique with therapeutic exercises, and would benefit from continued PT      Plan: Continue per plan of care.       Precautions: none     Daily Treatment Diary:      Initial Evaluation Date: 23  Compliance             Visit Number 11            Re-Eval              Foto Captured                                     Manual              Scap glide/distraction              CTJ HVLAT                            Ther-Ex              Levator/Infraspinatus/Pec minor tack and stretch 8'                         Thoracic Ext  3x10            Thoracic ROT  x15 (B)                                        Corner Pec stretch   6x20"            Standing (B) OH reach c step 2x20             IR stretch c strap  4x30"            Shoulder flexion c cane  Sup Full Arc  2# 2x20            Banded Row  10#  3x15            Scap  2# 3x10 5" ecc                           Edu               Neuro Re-Ed              (B) Banded ER  Grn 2x10 5" Prone Scap Ret  10x10"            PNF D2 MRE  3x10             Ball on wall CW/CCW  2x20 ea                           Ther-Act                                                            Modalities

## 2023-12-05 ENCOUNTER — OFFICE VISIT (OUTPATIENT)
Age: 43
End: 2023-12-05
Payer: OTHER MISCELLANEOUS

## 2023-12-05 DIAGNOSIS — M75.21 BICIPITAL TENDINITIS OF RIGHT SHOULDER: ICD-10-CM

## 2023-12-05 DIAGNOSIS — S46.911D STRAIN OF RIGHT SHOULDER, SUBSEQUENT ENCOUNTER: ICD-10-CM

## 2023-12-05 DIAGNOSIS — M67.819 TENDINOSIS OF ROTATOR CUFF: ICD-10-CM

## 2023-12-05 DIAGNOSIS — M25.511 CHRONIC RIGHT SHOULDER PAIN: Primary | ICD-10-CM

## 2023-12-05 DIAGNOSIS — M54.2 CERVICALGIA: ICD-10-CM

## 2023-12-05 DIAGNOSIS — G89.29 CHRONIC RIGHT SHOULDER PAIN: Primary | ICD-10-CM

## 2023-12-05 DIAGNOSIS — M54.2 NECK PAIN: ICD-10-CM

## 2023-12-05 PROCEDURE — 97110 THERAPEUTIC EXERCISES: CPT | Performed by: PHYSICAL THERAPIST

## 2023-12-05 PROCEDURE — 97112 NEUROMUSCULAR REEDUCATION: CPT | Performed by: PHYSICAL THERAPIST

## 2023-12-05 PROCEDURE — 97140 MANUAL THERAPY 1/> REGIONS: CPT | Performed by: PHYSICAL THERAPIST

## 2023-12-05 NOTE — PROGRESS NOTES
Daily Note     Today's date: 2023  Patient name: Melia De Los Santos  : 1980  MRN: [id number]  Referring provider: Tello Crandall MD  Dx:   Encounter Diagnosis     ICD-10-CM    1. Chronic right shoulder pain  M25.511     G89.29       2. Neck pain  M54.2       3. Strain of right shoulder, subsequent encounter  S46.914S       4. Bicipital tendinitis of right shoulder  M75.21       5. Cervicalgia  M54.2       6. Tendinosis of rotator cuff  M67.819           Start Time: 845  Stop Time: 940  Total time in clinic (min): 55 minutes    Subjective: pt states she's not sleeping well and needs to take meds to sleep more than 3hr. (B) shoulder pain. Objective: See treatment diary below      Assessment: Tolerated treatment fair. STM perf for (B) parascapular mm spasm. Step and reach tasks also perf to improved movement patterns. Emphasis placed on avoiding compensatory movement and proper reaching sequencing. TrP noted (B) levator scapula and motion imrpvoed following tack and stretch. Patient demonstrated fatigue post treatment, exhibited good technique with therapeutic exercises, and would benefit from continued PT      Plan: Continue per plan of care.       Precautions: none     Daily Treatment Diary:      Initial Evaluation Date: 23  Compliance            Visit Number 11 12           Re-Eval              Foto Captured                                    Manual              Scap glide/distraction              [place], levator STM   8'                         Ther-Ex              Levator/Infraspinatus/Pec minor tack and stretch 8' 8'                        Thoracic Ext  3x10 3x10           Thoracic ROT  x15 (B)                                        Corner Pec stretch   6x20" 6x20"           Standing (B) OH reach c step 2x20  NR           IR stretch c strap  4x30" 4x30"           Shoulder flexion c cane  Sup Full Arc  2# 2x20  Sup Full Arc  2# 2x20           Banded Row  10#  3x15 10#  3x15           Scap  2# 3x10 5" ecc 2# 3x10 5" ecc                          Edu               Neuro Re-Ed              (B) Banded ER  Grn 2x10 5" Grn 2x10 5"           Prone Scap Ret  10x10" 10x10"           PNF D2 MRE  3x10 3x10            Ball on wall CW/CCW  2x20 ea 2x20 ea            Standing (B) OH reach c step   2x20            Ther-Act                                                            Modalities

## 2023-12-12 ENCOUNTER — OFFICE VISIT (OUTPATIENT)
Age: 43
End: 2023-12-12
Payer: OTHER MISCELLANEOUS

## 2023-12-12 DIAGNOSIS — S46.911D STRAIN OF RIGHT SHOULDER, SUBSEQUENT ENCOUNTER: ICD-10-CM

## 2023-12-12 DIAGNOSIS — M54.2 CERVICALGIA: Primary | ICD-10-CM

## 2023-12-12 DIAGNOSIS — M67.819 TENDINOSIS OF ROTATOR CUFF: ICD-10-CM

## 2023-12-12 DIAGNOSIS — M75.21 BICIPITAL TENDINITIS OF RIGHT SHOULDER: ICD-10-CM

## 2023-12-12 DIAGNOSIS — M54.2 NECK PAIN: ICD-10-CM

## 2023-12-12 DIAGNOSIS — G89.29 CHRONIC RIGHT SHOULDER PAIN: ICD-10-CM

## 2023-12-12 DIAGNOSIS — M25.511 CHRONIC RIGHT SHOULDER PAIN: ICD-10-CM

## 2023-12-12 PROCEDURE — 97110 THERAPEUTIC EXERCISES: CPT | Performed by: PHYSICAL THERAPIST

## 2023-12-12 PROCEDURE — 97140 MANUAL THERAPY 1/> REGIONS: CPT | Performed by: PHYSICAL THERAPIST

## 2023-12-12 PROCEDURE — 97112 NEUROMUSCULAR REEDUCATION: CPT | Performed by: PHYSICAL THERAPIST

## 2023-12-13 NOTE — PROGRESS NOTES
Daily Note     Today's date: 2023  Patient name: José Olguin  : 1980  MRN: [id number]  Referring provider: Selwyn Felty, MD  Dx:   Encounter Diagnosis     ICD-10-CM    1. Cervicalgia  M54.2       2. Chronic right shoulder pain  M25.511     G89.29       3. Neck pain  M54.2       4. Bicipital tendinitis of right shoulder  M75.21       5. Tendinosis of rotator cuff  M67.819       6. Strain of right shoulder, subsequent encounter  S46.911D           Start Time: 845  Stop Time: 940  Total time in clinic (min): 55 minutes    Subjective: pt reports (B) shoulder pain. Still having L [place] spasm. Objective: See treatment diary below      Assessment: Tolerated treatment fair. STM perf for (B) parascapular mm spasm. Pec minor also in spasm and my be causing dysfunction in F couple of shoulder. Step and reach tasks also perf to improved movement patterns. Emphasis placed on avoiding compensatory movement and proper reaching sequencing. TrP noted (B) levator scapula and motion imrpvoed following tack and stretch. Patient demonstrated fatigue post treatment, exhibited good technique with therapeutic exercises, and would benefit from continued PT      Plan: Continue per plan of care.       Precautions: none     Daily Treatment Diary:      Initial Evaluation Date: 23  Compliance           Visit Number 11 12 13          Re-Eval              Foto Captured                                   Manual              Scap glide/distraction              [place], levator STM   8' 8'                        Ther-Ex              Levator/Infraspinatus/Pec minor tack and stretch 8' 8' 8'                       Thoracic Ext  3x10 3x10 3x10          Thoracic ROT  x15 (B)                                        Corner Pec stretch   6x20" 6x20" Supie  4'          Standing (B) OH reach c step 2x20  NR           IR stretch c strap  4x30" 4x30" 4x30"          Shoulder flexion c cane  Sup Full Arc  2# 2x20  Sup Full Arc  2# 2x20 Sup Full Arc  2# 2x20          Banded Row  10#  3x15 10#  3x15 10#  3x15          Scap  2# 3x10 5" ecc 2# 3x10 5" ecc 2# 3x10 5" ecc                         Edu               Neuro Re-Ed              (B) Banded ER  Grn 2x10 5" Grn 2x10 5" Grn 2x10 5"          Prone Scap Ret  10x10" 10x10" 10x10"          PNF D2 MRE  3x10 3x10 3x10           Ball on wall CW/CCW  2x20 ea 2x20 ea 2x20 ea           Standing (B) OH reach c step   2x20  2x20           Ther-Act                                                            Modalities

## 2023-12-14 ENCOUNTER — OFFICE VISIT (OUTPATIENT)
Age: 43
End: 2023-12-14
Payer: OTHER MISCELLANEOUS

## 2023-12-14 DIAGNOSIS — G89.29 CHRONIC RIGHT SHOULDER PAIN: ICD-10-CM

## 2023-12-14 DIAGNOSIS — S46.911D STRAIN OF RIGHT SHOULDER, SUBSEQUENT ENCOUNTER: ICD-10-CM

## 2023-12-14 DIAGNOSIS — M54.2 NECK PAIN: ICD-10-CM

## 2023-12-14 DIAGNOSIS — M25.511 CHRONIC RIGHT SHOULDER PAIN: ICD-10-CM

## 2023-12-14 DIAGNOSIS — M75.21 BICIPITAL TENDINITIS OF RIGHT SHOULDER: ICD-10-CM

## 2023-12-14 DIAGNOSIS — M54.2 CERVICALGIA: Primary | ICD-10-CM

## 2023-12-14 DIAGNOSIS — M67.819 TENDINOSIS OF ROTATOR CUFF: ICD-10-CM

## 2023-12-14 PROCEDURE — 97140 MANUAL THERAPY 1/> REGIONS: CPT | Performed by: PHYSICAL THERAPIST

## 2023-12-14 PROCEDURE — 97112 NEUROMUSCULAR REEDUCATION: CPT | Performed by: PHYSICAL THERAPIST

## 2023-12-14 PROCEDURE — 97110 THERAPEUTIC EXERCISES: CPT | Performed by: PHYSICAL THERAPIST

## 2023-12-14 NOTE — PROGRESS NOTES
Daily Note     Today's date: 2023  Patient name: Johnny Hung  : 1980  MRN: [id number]  Referring provider: Monet Allison MD  Dx:   Encounter Diagnosis     ICD-10-CM    1. Cervicalgia  M54.2       2. Neck pain  M54.2       3. Chronic right shoulder pain  M25.511     G89.29       4. Strain of right shoulder, subsequent encounter  S46.911D       5. Tendinosis of rotator cuff  M67.819       6. Bicipital tendinitis of right shoulder  M75.21           Start Time: 930  Stop Time: 1025  Total time in clinic (min): 55 minutes    Subjective: Pt feels she is looser compared to last session. Has been doing pec stretching and feels it's helping. Objective: See treatment diary below      Assessment: Tolerated treatment fair. STM perf for (B) parascapular mm spasm. Pec minor stretching continued causing dysfunction in F couple of shoulder. Step and reach tasks also perf to improved movement patterns. Emphasis placed on avoiding compensatory movement and proper reaching sequencing. TrP noted (B) levator scapula and motion imrpvoed following tack and stretch. Patient demonstrated fatigue post treatment, exhibited good technique with therapeutic exercises, and would benefit from continued PT      Plan: Continue per plan of care.       Precautions: none     Daily Treatment Diary:      Initial Evaluation Date: 23  Compliance          Visit Number 11 12 13 14         Re-Eval              Foto Captured                                  Manual              Scap glide/distraction              [place], levator STM   8' 8' 8'                       Ther-Ex              Levator/Infraspinatus/Pec minor tack and stretch 8' 8' 8' 8'                      Thoracic Ext  3x10 3x10 3x10 3x10         Thoracic ROT  x15 (B)                                        Corner Pec stretch   6x20" 6x20" Supine  4' Supine  4'         Standing (B) OH reach c step 2x20  NR           IR stretch c strap  4x30" 4x30" 4x30" 4x30"         Shoulder flexion c cane  Sup Full Arc  2# 2x20  Sup Full Arc  2# 2x20 Sup Full Arc  2# 2x20 Sup Full Arc  2# 2x20         Banded Row  10#  3x15 10#  3x15 10#  3x15 10#  3x15         Scap  2# 3x10 5" ecc 2# 3x10 5" ecc 2# 3x10 5" ecc 2# 3x10 5" ecc                        Edu               Neuro Re-Ed              (B) Banded ER  Grn 2x10 5" Grn 2x10 5" Grn 2x10 5" Grn 2x10 5"         Prone Scap Ret  10x10" 10x10" 10x10" 10x10"         PNF D2 MRE  3x10 3x10 3x10 3x10          Ball on wall CW/CCW  2x20 ea 2x20 ea 2x20 ea 2x20 ea          Standing (B) OH reach c step   2x20  2x20  2x20          Ther-Act                                                            Modalities

## 2023-12-19 ENCOUNTER — APPOINTMENT (OUTPATIENT)
Age: 43
End: 2023-12-19
Payer: OTHER MISCELLANEOUS

## 2023-12-21 ENCOUNTER — OFFICE VISIT (OUTPATIENT)
Age: 43
End: 2023-12-21
Payer: OTHER MISCELLANEOUS

## 2023-12-21 DIAGNOSIS — S46.911D STRAIN OF RIGHT SHOULDER, SUBSEQUENT ENCOUNTER: ICD-10-CM

## 2023-12-21 DIAGNOSIS — M54.2 CERVICALGIA: ICD-10-CM

## 2023-12-21 DIAGNOSIS — M67.819 TENDINOSIS OF ROTATOR CUFF: Primary | ICD-10-CM

## 2023-12-21 DIAGNOSIS — M75.21 BICIPITAL TENDINITIS OF RIGHT SHOULDER: ICD-10-CM

## 2023-12-21 DIAGNOSIS — G89.29 CHRONIC RIGHT SHOULDER PAIN: ICD-10-CM

## 2023-12-21 DIAGNOSIS — M25.511 CHRONIC RIGHT SHOULDER PAIN: ICD-10-CM

## 2023-12-21 DIAGNOSIS — M54.2 NECK PAIN: ICD-10-CM

## 2023-12-21 PROCEDURE — 97140 MANUAL THERAPY 1/> REGIONS: CPT | Performed by: PHYSICAL THERAPIST

## 2023-12-21 PROCEDURE — 97112 NEUROMUSCULAR REEDUCATION: CPT | Performed by: PHYSICAL THERAPIST

## 2023-12-21 PROCEDURE — 97110 THERAPEUTIC EXERCISES: CPT | Performed by: PHYSICAL THERAPIST

## 2023-12-21 NOTE — PROGRESS NOTES
Daily Note     Today's date: 2023  Patient name: Sukhwinder Coe  : 1980  MRN: [id number]  Referring provider: Theo Gar MD  Dx:   Encounter Diagnosis     ICD-10-CM    1. Tendinosis of rotator cuff  M67.819       2. Cervicalgia  M54.2       3. Neck pain  M54.2       4. Bicipital tendinitis of right shoulder  M75.21       5. Chronic right shoulder pain  M25.511     G89.29       6. Strain of right shoulder, subsequent encounter  S46.911D           Start Time: 930  Stop Time:   Total time in clinic (min): 55 minutes    Subjective: Pt felt she had less pain and decreased spasm following last session until 2 days ago. Didn't do anything new, maybe slept awkwardly. Saw pain management yesterday and she will return in 1 month for PRP injections.      Objective: See treatment diary below      Assessment: Tolerated treatment fair. Due to benefit following last session, Rx was maintained. STM perf for (B) parascapular mm spasm. Pec minor stretching continued causing dysfunction in F couple of shoulder. Step and reach tasks also perf to improved movement patterns. Emphasis placed on avoiding compensatory movement and proper reaching sequencing. TrP noted (B) levator scapula and motion imrpvoed following tack and stretch. Patient demonstrated fatigue post treatment, exhibited good technique with therapeutic exercises, and would benefit from continued PT      Plan: Continue per plan of care.      Precautions: none     Daily Treatment Diary:      Initial Evaluation Date: 23  Compliance         Visit Number 11 12 13 14 15        Re-Angie Macto Captured                                 Manual              Scap glide/distraction              [place], levator STM   8' 8' 8' 8'                      Ther-Ex              Levator/Infraspinatus/Pec minor tack and stretch 8' 8' 8' 8' 8'                     Thoracic Ext  3x10 3x10 3x10 3x10  "3x10        Thoracic ROT  x15 (B)                                        Corner Pec stretch   6x20\" 6x20\" Supine  4' Supine  4' Supine  4'        Standing (B) OH reach c step 2x20  NR           IR stretch c strap  4x30\" 4x30\" 4x30\" 4x30\" 4x30\"        Shoulder flexion c cane  Sup Full Arc  2# 2x20  Sup Full Arc  2# 2x20 Sup Full Arc  2# 2x20 Sup Full Arc  2# 2x20 Sup Full Arc  2# 2x20        Banded Row  10#  3x15 10#  3x15 10#  3x15 10#  3x15 10#  3x15        Scap  2# 3x10 5\" ecc 2# 3x10 5\" ecc 2# 3x10 5\" ecc 2# 3x10 5\" ecc 2# 3x10 5\" ecc                       Edu               Neuro Re-Ed              (B) Banded ER  Grn 2x10 5\" Grn 2x10 5\" Grn 2x10 5\" Grn 2x10 5\" Grn 2x10 5\"        Prone Scap Ret  10x10\" 10x10\" 10x10\" 10x10\" 10x10\"        PNF D2 MRE  3x10 3x10 3x10 3x10 3x10         Ball on wall CW/CCW  2x20 ea 2x20 ea 2x20 ea 2x20 ea 1kg   2x20 ea         Standing (B) OH reach c step   2x20  2x20  2x20  2x20         Ther-Act                                                            Modalities                                                                  "

## 2024-01-03 ENCOUNTER — EVALUATION (OUTPATIENT)
Age: 44
End: 2024-01-03
Payer: OTHER MISCELLANEOUS

## 2024-01-03 DIAGNOSIS — M75.21 BICIPITAL TENDINITIS OF RIGHT SHOULDER: ICD-10-CM

## 2024-01-03 DIAGNOSIS — G89.29 CHRONIC RIGHT SHOULDER PAIN: ICD-10-CM

## 2024-01-03 DIAGNOSIS — M54.2 NECK PAIN: ICD-10-CM

## 2024-01-03 DIAGNOSIS — M25.511 CHRONIC RIGHT SHOULDER PAIN: ICD-10-CM

## 2024-01-03 DIAGNOSIS — S46.911D STRAIN OF RIGHT SHOULDER, SUBSEQUENT ENCOUNTER: ICD-10-CM

## 2024-01-03 DIAGNOSIS — M54.2 CERVICALGIA: ICD-10-CM

## 2024-01-03 DIAGNOSIS — M67.819 TENDINOSIS OF ROTATOR CUFF: Primary | ICD-10-CM

## 2024-01-03 PROCEDURE — 97140 MANUAL THERAPY 1/> REGIONS: CPT | Performed by: PHYSICAL THERAPIST

## 2024-01-03 PROCEDURE — 97112 NEUROMUSCULAR REEDUCATION: CPT | Performed by: PHYSICAL THERAPIST

## 2024-01-03 PROCEDURE — 97110 THERAPEUTIC EXERCISES: CPT | Performed by: PHYSICAL THERAPIST

## 2024-01-03 NOTE — PROGRESS NOTES
PT Re-Evaluation     Today's date: 1/3/2024  Patient name: Sukhwinder Coe  : 1980  MRN: [id number]  Referring provider: Theo Gar MD  Dx:   Encounter Diagnosis     ICD-10-CM    1. Tendinosis of rotator cuff  M67.819       2. Cervicalgia  M54.2       3. Neck pain  M54.2       4. Bicipital tendinitis of right shoulder  M75.21       5. Strain of right shoulder, subsequent encounter  S46.911D       6. Chronic right shoulder pain  M25.511     G89.29             Start Time: 0900  Stop Time: 1000  Total time in clinic (min): 60 minutes    Assessment  Assessment details: Sukhwinder Coe has been seen for 16 sessions of PT related to neck and R shoulder pain. They have improved with: Cx ROM and assoc mm spasm. They remain limited with: R shoulder pain and weakness especially OH. Shoulder and parascapular tenderness limits progression and updated HEP was given today to improve AROM and decrease spasm. Special tests continue to indicate possible labral involvement in R shoulder. Current FOTO outcome measure score is 45, compared to 41 at last progress note. They would benefit from continued course of PT to meet all established goals and facilitate return to PLOF.      Impairments: abnormal muscle tone, abnormal or restricted ROM, impaired physical strength, lacks appropriate home exercise program, pain with function and scapular dyskinesis    Symptom irritability: moderateUnderstanding of Dx/Px/POC: good   Prognosis: good    Goals  Short Term Functional Goals:   In 4 weeks patient will:   Decrease R shoulder pain to 4 on a scale of 0-10 to allow turning head and performing ADL.   Increase AROM of R shoulder 50% improved symmetry compared to other side for performance of reach/carry tasks.   Patient will demonstrate 50% independence and compliance with HEP to maximize improvements in functional mobility.   Patient will demonstrate independence with proper posture, body mechanics, self pain management, and ADL  modification.   pt will increase FOTO score by 10pts to reflect a statistically significant improvement.       Long Term Functional Goals (Goals for patient at discharge):    In 6 weeks patient will:   Decrease R shoulder pain to 2 on a scale of 0-10 to allow RTW s limitations.   Increase AROM of R shoulder to symmetrical c other shoulder for performance of reach/carry tasks.   pt will score at or above predicted discharge FOTO score of 56 to reflect improvement in subjective functional ability.           Plan  Patient would benefit from: skilled physical therapy  Referral necessary: No  Planned modality interventions: cryotherapy and thermotherapy: hydrocollator packs  Planned therapy interventions: manual therapy, neuromuscular re-education, patient education, therapeutic activities, therapeutic exercise and home exercise program  Frequency: 2x week  Duration in weeks: 6  Plan of Care beginning date: 1/3/2024  Plan of Care expiration date: 2024  Treatment plan discussed with: patient        Subjective Evaluation    History of Present Illness  Date of onset: 3/6/2023  Mechanism of injury:  Pt reports being about 25% back to the desired level of function at this time. HA symp have sig improved both in freq and intensity. Still gets them a few times per week. Neck symp no localized more to CT junction. R Shoulder pain persists and limits lifting and pulling. Has seen pain management and not getting sig relief. Been noticing more spasm across Ant R shoulder and sharp pain along medial boarder of scapula (B).            Not a recurrent problem   Quality of life: good    Patient Goals  Patient goals for therapy: decreased pain, increased motion, independence with ADLs/IADLs, increased strength and return to work    Pain  Current pain rating: 3  At best pain rating: 3  At worst pain ratin  Quality: dull ache, radiating, tight and sharp  Relieving factors: ice and medications  Aggravating factors: overhead  activity and lifting  Progression: no change    Social Support    Employment status: working (modified duty)  Hand dominance: right    Treatments  Previous treatment: injection treatment        Objective     Palpation     Right   Muscle spasm in the pectoralis minor.   Tenderness of the upper trapezius.     Tenderness     Left Shoulder   Tenderness in the medial scapula.     Right Shoulder  Tenderness in the biceps tendon (proximal), medial scapula and supraspinatus tendon.     Active Range of Motion   Cervical/Thoracic Spine       Cervical    Flexion: 50 degrees   Extension: 45 degrees     with pain  Left lateral flexion: 30 degrees      Right lateral flexion: 30 degrees      Left rotation: 55 degrees with pain  Right rotation: 55 degrees       Thoracic    Flexion:  Restriction level: minimal  Extension:  Restriction level: maximal  Left rotation:  Restriction level: moderate  Right rotation:  Restriction level: moderate  Left Shoulder   Flexion: 160 degrees   Abduction: 165 degrees   External rotation BTH: T4   Internal rotation BTB: T8     Right Shoulder   Flexion: 150 degrees with pain  Abduction: 120 degrees with pain  External rotation BTH: T2   Internal rotation BTB: L1     Passive Range of Motion     Right Shoulder   Flexion: 150 degrees   Abduction: 115 degrees   External rotation 90°: 80 degrees   Internal rotation 90°: 20 degrees     Joint Play     Hypomobile: T3, T4, T5, T6, T7 and T8     Pain: T3, T4, T5, T6, T7 and T8     Strength/Myotome Testing     Left Shoulder   Normal muscle strength    Right Shoulder     Planes of Motion   Flexion: 4-   Abduction: 3+   External rotation at 90°: 3+   Internal rotation at 90°: 3+     Tests     Right Shoulder   Positive horn blower, painful arc and bicep load .                Precautions: none     Daily Treatment Diary:      Initial Evaluation Date: 09/21/23  Compliance 11/30 12/5 12/12 12/14 12/21  1/3           Visit Number 11 12 13 14 15  16           Re-Eval      "                   Foto Captured                                   11/30 12/5 12/12 12/14 12/21  1/3           Manual                       Scap glide/distraction            4'           [place], levator STM   8' 8' 8' 8'  8'                                   Ther-Ex                       Levator/Infraspinatus/Pec minor tack and stretch 8' 8' 8' 8' 8'  8'  Lacrosse ball (pec)                                    Thoracic Ext  3x10 3x10 3x10 3x10 3x10  3x10           Thoracic ROT  x15 (B)          x15 (B)                                                           Corner Pec stretch   6x20\" 6x20\" Supine  4' Supine  4' Supine  4'  Supine  4'           Standing (B) OH reach c step 2x20  NR                   IR stretch c strap  4x30\" 4x30\" 4x30\" 4x30\" 4x30\"  4x30\"           Shoulder flexion c cane  Sup Full Arc  2# 2x20  Sup Full Arc  2# 2x20 Sup Full Arc  2# 2x20 Sup Full Arc  2# 2x20 Sup Full Arc  2# 2x20  NT           Banded Row  10#  3x15 10#  3x15 10#  3x15 10#  3x15 10#  3x15  10#  3x15           Scap  2# 3x10 5\" ecc 2# 3x10 5\" ecc 2# 3x10 5\" ecc 2# 3x10 5\" ecc 2# 3x10 5\" ecc  2# 3x10 5\" ecc                                   Edu                        Neuro Re-Ed                       (B) Banded ER  Grn 2x10 5\" Grn 2x10 5\" Grn 2x10 5\" Grn 2x10 5\" Grn 2x10 5\"  Grn 2x10 5\"           Prone Scap Ret  10x10\" 10x10\" 10x10\" 10x10\" 10x10\"  10x10\"           PNF D2 MRE  3x10 3x10 3x10 3x10 3x10  NT            Ball on wall CW/CCW  2x20 ea 2x20 ea 2x20 ea 2x20 ea 1kg   2x20 ea  1kg   2x20 ea            Standing (B) OH reach c step   2x20  2x20  2x20  2x20   2x20            Ther-Act                                                                                                Modalities                                                                                                    "

## 2024-01-05 ENCOUNTER — OFFICE VISIT (OUTPATIENT)
Age: 44
End: 2024-01-05
Payer: OTHER MISCELLANEOUS

## 2024-01-05 DIAGNOSIS — M54.2 CERVICALGIA: ICD-10-CM

## 2024-01-05 DIAGNOSIS — G89.29 CHRONIC RIGHT SHOULDER PAIN: ICD-10-CM

## 2024-01-05 DIAGNOSIS — M25.511 CHRONIC RIGHT SHOULDER PAIN: ICD-10-CM

## 2024-01-05 DIAGNOSIS — S46.911D STRAIN OF RIGHT SHOULDER, SUBSEQUENT ENCOUNTER: Primary | ICD-10-CM

## 2024-01-05 DIAGNOSIS — M67.819 TENDINOSIS OF ROTATOR CUFF: ICD-10-CM

## 2024-01-05 DIAGNOSIS — M54.2 NECK PAIN: ICD-10-CM

## 2024-01-05 PROCEDURE — 97112 NEUROMUSCULAR REEDUCATION: CPT | Performed by: PHYSICAL THERAPIST

## 2024-01-05 PROCEDURE — 97110 THERAPEUTIC EXERCISES: CPT | Performed by: PHYSICAL THERAPIST

## 2024-01-05 PROCEDURE — 97140 MANUAL THERAPY 1/> REGIONS: CPT | Performed by: PHYSICAL THERAPIST

## 2024-01-05 NOTE — PROGRESS NOTES
Daily Note     Today's date: 2024  Patient name: Sukhwinder Coe  : 1980  MRN: [id number]  Referring provider: Theo Gar MD  Dx:   Encounter Diagnosis     ICD-10-CM    1. Strain of right shoulder, subsequent encounter  S46.911D       2. Chronic right shoulder pain  M25.511     G89.29       3. Tendinosis of rotator cuff  M67.819       4. Cervicalgia  M54.2       5. Neck pain  M54.2           Start Time: 0815  Stop Time: 910  Total time in clinic (min): 55 minutes    Subjective: pt reports tenderness and soreness of Ant chest and shoulder c addition of self STM as part of HEP.      Objective: See treatment diary below      Assessment: STM perf for (B) parascapular mm spasm followed by Pec minor stretching and AROM thoracic mobility exercise. Step and reach tasks also perf to improved movement patterns. Shoulder AROM is improving week over week. Emphasis placed on avoiding compensatory movement and increasing strength through available range. Patient demonstrated fatigue post treatment, exhibited good technique with therapeutic exercises, and would benefit from continued PT       Plan: Continue per plan of care.  Progress treatment as tolerated.       Precautions: none     Daily Treatment Diary:      Initial Evaluation Date: 23  Compliance 11/30 12/5 12/12 12/14 12/21  1/3  1/5         Visit Number 11 12 13 14 15  16  17         Re-Eval                        Foto Captured                                   11/30 12/5 12/12 12/14 12/21  1/3  1/5         Manual                       Scap glide/distraction            4'           [place], levator STM   8' 8' 8' 8'  8'  8'                                 Ther-Ex                       Levator/Infraspinatus/Pec minor tack and stretch 8' 8' 8' 8' 8'  8'  Lacrosse ball (pec)   4'  Lacrosse ball (pec)                                 Thoracic Ext  3x10 3x10 3x10 3x10 3x10  3x10  3x10         Thoracic ROT  x15 (B)          x15 (B)  x15 (B)                "                                          Corner Pec stretch   6x20\" 6x20\" Supine  4' Supine  4' Supine  4'  Supine  4'  Supine  4'         Standing (B) OH reach c step 2x20  NR                   IR stretch c strap  4x30\" 4x30\" 4x30\" 4x30\" 4x30\"  4x30\"  4x30\"         Shoulder flexion c cane  Sup Full Arc  2# 2x20  Sup Full Arc  2# 2x20 Sup Full Arc  2# 2x20 Sup Full Arc  2# 2x20 Sup Full Arc  2# 2x20  NT           Banded Row  10#  3x15 10#  3x15 10#  3x15 10#  3x15 10#  3x15  10#  3x15  10#  3x15         Scap  2# 3x10 5\" ecc 2# 3x10 5\" ecc 2# 3x10 5\" ecc 2# 3x10 5\" ecc 2# 3x10 5\" ecc  2# 3x10 5\" ecc  2# 3x10 5\" ecc                                 Edu                        Neuro Re-Ed                       (B) Banded ER  Grn 2x10 5\" Grn 2x10 5\" Grn 2x10 5\" Grn 2x10 5\" Grn 2x10 5\"  Grn 2x10 5\"  Grn 2x10 5\"         Prone Scap Ret  10x10\" 10x10\" 10x10\" 10x10\" 10x10\"  10x10\"  10x10\"         PNF D2 MRE  3x10 3x10 3x10 3x10 3x10  NT            Ball on wall CW/CCW  2x20 ea 2x20 ea 2x20 ea 2x20 ea 1kg   2x20 ea  1kg   2x20 ea  1kg   2x20 ea          Standing (B) OH reach c step   2x20  2x20  2x20  2x20   2x20   2x20          Ther-Act                                                                                                Modalities                                                                                                      "

## 2024-01-11 ENCOUNTER — OFFICE VISIT (OUTPATIENT)
Age: 44
End: 2024-01-11
Payer: OTHER MISCELLANEOUS

## 2024-01-11 DIAGNOSIS — S46.911D STRAIN OF RIGHT SHOULDER, SUBSEQUENT ENCOUNTER: Primary | ICD-10-CM

## 2024-01-11 DIAGNOSIS — M67.819 TENDINOSIS OF ROTATOR CUFF: ICD-10-CM

## 2024-01-11 DIAGNOSIS — M75.21 BICIPITAL TENDINITIS OF RIGHT SHOULDER: ICD-10-CM

## 2024-01-11 DIAGNOSIS — M25.511 CHRONIC RIGHT SHOULDER PAIN: ICD-10-CM

## 2024-01-11 DIAGNOSIS — M54.2 NECK PAIN: ICD-10-CM

## 2024-01-11 DIAGNOSIS — G89.29 CHRONIC RIGHT SHOULDER PAIN: ICD-10-CM

## 2024-01-11 PROCEDURE — 97140 MANUAL THERAPY 1/> REGIONS: CPT | Performed by: PHYSICAL THERAPIST

## 2024-01-11 PROCEDURE — 97112 NEUROMUSCULAR REEDUCATION: CPT | Performed by: PHYSICAL THERAPIST

## 2024-01-11 PROCEDURE — 97110 THERAPEUTIC EXERCISES: CPT | Performed by: PHYSICAL THERAPIST

## 2024-01-11 NOTE — PROGRESS NOTES
Daily Note     Today's date: 2024  Patient name: Sukhwinder Coe  : 1980  MRN: [id number]  Referring provider: Theo Gar MD  Dx:   Encounter Diagnosis     ICD-10-CM    1. Strain of right shoulder, subsequent encounter  S46.911D       2. Chronic right shoulder pain  M25.511     G89.29       3. Tendinosis of rotator cuff  M67.819       4. Neck pain  M54.2       5. Bicipital tendinitis of right shoulder  M75.21           Start Time: 0900  Stop Time: 09  Total time in clinic (min): 55 minutes    Subjective: pt states she had brief relief following last session and with HEP. Pain tends to come right back.       Objective: See treatment diary below      Assessment: STM perf for (B) parascapular mm spasm followed by Pec minor stretching and AROM thoracic mobility exercise. Step and reach tasks also perf to improved movement patterns. Emphasis placed on avoiding compensatory movement and increasing strength through available range. Patient demonstrated fatigue post treatment, exhibited good technique with therapeutic exercises, and would benefit from continued PT       Plan: Continue per plan of care.  Progress treatment as tolerated.       Precautions: none     Daily Treatment Diary:      Initial Evaluation Date: 23  Compliance 11/30 12/5 12/12 12/14 12/21  1/3  1/5  1/11       Visit Number 11 12 13 14 15  16  17  18       Re-Eval                        Foto Captured                                   11/30 12/5 12/12 12/14 12/21  1/3  1/5  1/11       Manual                       Scap glide/distraction            4'           [place], levator STM   8' 8' 8' 8'  8'  8'  8'                               Ther-Ex                       Levator/Infraspinatus/Pec minor tack and stretch 8' 8' 8' 8' 8'  8'  Lacrosse ball (pec)   4'  Lacrosse ball (pec)  4'  Lacrosse ball (pec)                               Thoracic Ext  3x10 3x10 3x10 3x10 3x10  3x10  3x10  3x10       Thoracic ROT  x15 (B)          x15  "(B)  x15 (B)  x15 (B)                                                       Corner Pec stretch   6x20\" 6x20\" Supine  4' Supine  4' Supine  4'  Supine  4'  Supine  4'  Supine  4'       Standing (B) OH reach c step 2x20  NR                   IR stretch c strap  4x30\" 4x30\" 4x30\" 4x30\" 4x30\"  4x30\"  4x30\"  4x30\"       Shoulder flexion c cane  Sup Full Arc  2# 2x20  Sup Full Arc  2# 2x20 Sup Full Arc  2# 2x20 Sup Full Arc  2# 2x20 Sup Full Arc  2# 2x20  NT           Banded Row  10#  3x15 10#  3x15 10#  3x15 10#  3x15 10#  3x15  10#  3x15  10#  3x15  10#  3x15       Scap  2# 3x10 5\" ecc 2# 3x10 5\" ecc 2# 3x10 5\" ecc 2# 3x10 5\" ecc 2# 3x10 5\" ecc  2# 3x10 5\" ecc  2# 3x10 5\" ecc  2# 3x10 5\" ecc                               Edu                        Neuro Re-Ed                       (B) Banded ER  Grn 2x10 5\" Grn 2x10 5\" Grn 2x10 5\" Grn 2x10 5\" Grn 2x10 5\"  Grn 2x10 5\"  Grn 2x10 5\"  Grn 2x10 5\"       Prone Scap Ret  10x10\" 10x10\" 10x10\" 10x10\" 10x10\"  10x10\"  10x10\"  10x10\"       PNF D2 MRE  3x10 3x10 3x10 3x10 3x10  NT            Ball on wall CW/CCW  2x20 ea 2x20 ea 2x20 ea 2x20 ea 1kg   2x20 ea  1kg   2x20 ea  1kg   2x20 ea  1kg   2x20 ea        Standing (B) OH reach c step   2x20  2x20  2x20  2x20   2x20   2x20   2x20        Ther-Act                                                                                                Modalities                                                                                                      "

## 2024-01-16 ENCOUNTER — APPOINTMENT (OUTPATIENT)
Age: 44
End: 2024-01-16
Payer: OTHER MISCELLANEOUS

## 2024-01-17 ENCOUNTER — APPOINTMENT (OUTPATIENT)
Age: 44
End: 2024-01-17
Payer: OTHER MISCELLANEOUS

## 2024-01-18 ENCOUNTER — APPOINTMENT (OUTPATIENT)
Age: 44
End: 2024-01-18
Payer: OTHER MISCELLANEOUS

## 2024-01-23 ENCOUNTER — OFFICE VISIT (OUTPATIENT)
Age: 44
End: 2024-01-23
Payer: OTHER MISCELLANEOUS

## 2024-01-23 DIAGNOSIS — M25.511 CHRONIC RIGHT SHOULDER PAIN: ICD-10-CM

## 2024-01-23 DIAGNOSIS — M75.21 BICIPITAL TENDINITIS OF RIGHT SHOULDER: ICD-10-CM

## 2024-01-23 DIAGNOSIS — M67.819 TENDINOSIS OF ROTATOR CUFF: ICD-10-CM

## 2024-01-23 DIAGNOSIS — G89.29 CHRONIC RIGHT SHOULDER PAIN: ICD-10-CM

## 2024-01-23 DIAGNOSIS — S46.911D STRAIN OF RIGHT SHOULDER, SUBSEQUENT ENCOUNTER: Primary | ICD-10-CM

## 2024-01-23 DIAGNOSIS — M54.2 CERVICALGIA: ICD-10-CM

## 2024-01-23 PROCEDURE — 97112 NEUROMUSCULAR REEDUCATION: CPT | Performed by: PHYSICAL THERAPIST

## 2024-01-23 PROCEDURE — 97140 MANUAL THERAPY 1/> REGIONS: CPT | Performed by: PHYSICAL THERAPIST

## 2024-01-23 PROCEDURE — 97110 THERAPEUTIC EXERCISES: CPT | Performed by: PHYSICAL THERAPIST

## 2024-01-23 NOTE — PROGRESS NOTES
Daily Note     Today's date: 2024  Patient name: Sukhwinder Coe  : 1980  MRN: [id number]  Referring provider: Theo Gar MD  Dx:   Encounter Diagnosis     ICD-10-CM    1. Strain of right shoulder, subsequent encounter  S46.911D       2. Chronic right shoulder pain  M25.511     G89.29       3. Tendinosis of rotator cuff  M67.819       4. Bicipital tendinitis of right shoulder  M75.21       5. Cervicalgia  M54.2           Start Time: 0800  Stop Time: 0855  Total time in clinic (min): 55 minutes    Subjective: pt states she continues to have increased spasm of the neck. No longer following with pain management due to poor result from last injection. Still unable to work due to lifting and pulling.      Objective: See treatment diary below      Assessment: Carryover remains limited between sessions. STM perf for (B) parascapular mm spasm followed by Pec minor stretching and AROM thoracic mobility exercise. This does consistently decrease her symp. Step and reach tasks also perf to improved movement patterns. Emphasis placed on avoiding compensatory movement and increasing strength through available range. Patient demonstrated fatigue post treatment, exhibited good technique with therapeutic exercises, and would benefit from continued PT       Plan: Continue per plan of care.  Progress treatment as tolerated.       Precautions: none     Daily Treatment Diary:      Initial Evaluation Date: 23  Compliance 11/30 12/5 12/12 12/14 12/21  1/3  1/5  1/11  1/23     Visit Number 11 12 13 14 15  16  17  18  19     Re-Eval                        Foto Captured                                   11/30 12/5 12/12 12/14 12/21  1/3  1/5  1/11  1/23     Manual                       Scap glide/distraction            4'           [place], levator STM   8' 8' 8' 8'  8'  8'  8'  8'                             Ther-Ex                       Levator/Infraspinatus/Pec minor tack and stretch 8' 8' 8' 8' 8'  8'  Lacrosse  "ball (pec)   4'  Lacrosse ball (pec)  4'  Lacrosse ball (pec)  4'  Lacrosse ball (pec)                             Thoracic Ext  3x10 3x10 3x10 3x10 3x10  3x10  3x10  3x10  3x10     Thoracic ROT  x15 (B)          x15 (B)  x15 (B)  x15 (B)  x15 (B)                                                     Corner Pec stretch   6x20\" 6x20\" Supine  4' Supine  4' Supine  4'  Supine  4'  Supine  4'  Supine  4'  Supine  4'     Standing (B) OH reach c step 2x20  NR                   IR stretch c strap  4x30\" 4x30\" 4x30\" 4x30\" 4x30\"  4x30\"  4x30\"  4x30\"  4x30\"     Shoulder flexion c cane  Sup Full Arc  2# 2x20  Sup Full Arc  2# 2x20 Sup Full Arc  2# 2x20 Sup Full Arc  2# 2x20 Sup Full Arc  2# 2x20  NT           Banded Row  10#  3x15 10#  3x15 10#  3x15 10#  3x15 10#  3x15  10#  3x15  10#  3x15  10#  3x15 10#  3x15      Scap  2# 3x10 5\" ecc 2# 3x10 5\" ecc 2# 3x10 5\" ecc 2# 3x10 5\" ecc 2# 3x10 5\" ecc  2# 3x10 5\" ecc  2# 3x10 5\" ecc  2# 3x10 5\" ecc 2# 3x10 5\" ecc                              Edu                        Neuro Re-Ed                       (B) Banded ER  Grn 2x10 5\" Grn 2x10 5\" Grn 2x10 5\" Grn 2x10 5\" Grn 2x10 5\"  Grn 2x10 5\"  Grn 2x10 5\"  Grn 2x10 5\"  Grn 2x10 5\"     Prone Scap Ret  10x10\" 10x10\" 10x10\" 10x10\" 10x10\"  10x10\"  10x10\"  10x10\"  10x10\"     PNF D2 MRE  3x10 3x10 3x10 3x10 3x10  NT            Ball on wall CW/CCW  2x20 ea 2x20 ea 2x20 ea 2x20 ea 1kg   2x20 ea  1kg   2x20 ea  1kg   2x20 ea  1kg   2x20 ea  1kg   2x20 ea      Standing (B) OH reach c step   2x20  2x20  2x20  2x20   2x20   2x20   2x20   2x20      Ther-Act                                                                                                Modalities                                                                                                      "

## 2024-03-07 ENCOUNTER — OFFICE VISIT (OUTPATIENT)
Age: 44
End: 2024-03-07
Payer: OTHER MISCELLANEOUS

## 2024-03-07 DIAGNOSIS — M75.21 BICIPITAL TENDINITIS OF RIGHT SHOULDER: ICD-10-CM

## 2024-03-07 DIAGNOSIS — M54.2 CERVICALGIA: ICD-10-CM

## 2024-03-07 DIAGNOSIS — G89.29 CHRONIC RIGHT SHOULDER PAIN: Primary | ICD-10-CM

## 2024-03-07 DIAGNOSIS — M25.511 CHRONIC RIGHT SHOULDER PAIN: Primary | ICD-10-CM

## 2024-03-07 DIAGNOSIS — S46.911D STRAIN OF RIGHT SHOULDER, SUBSEQUENT ENCOUNTER: ICD-10-CM

## 2024-03-07 DIAGNOSIS — M67.819 TENDINOSIS OF ROTATOR CUFF: ICD-10-CM

## 2024-03-07 PROCEDURE — 97112 NEUROMUSCULAR REEDUCATION: CPT | Performed by: PHYSICAL THERAPIST

## 2024-03-07 PROCEDURE — 97140 MANUAL THERAPY 1/> REGIONS: CPT | Performed by: PHYSICAL THERAPIST

## 2024-03-07 PROCEDURE — 97164 PT RE-EVAL EST PLAN CARE: CPT | Performed by: PHYSICAL THERAPIST

## 2024-03-07 NOTE — PROGRESS NOTES
PT Re-Evaluation     Today's date: 3/7/2024  Patient name: Sukhwinder Coe  : 1980  MRN: [id number]  Referring provider: Theo Gar MD  Dx:   Encounter Diagnosis     ICD-10-CM    1. Chronic right shoulder pain  M25.511     G89.29       2. Tendinosis of rotator cuff  M67.819       3. Strain of right shoulder, subsequent encounter  S46.911D       4. Bicipital tendinitis of right shoulder  M75.21       5. Cervicalgia  M54.2               Start Time: 1015  Stop Time: 1115  Total time in clinic (min): 60 minutes    Assessment  Assessment details: Sukhwinder Coe has been seen for 20 sessions of PT related to R shoulder and neck pain following a work injury. She returned to PT today following a 6wk lapse in care. Following exam, she remains limited with: sleeping, push/pull tasks, and carrying objects that would be required by her job. mm spasm of upper Cx and parascapular mm appear to be CC due to impact on pain levels and difficulty sleeping. Both ROM and MMT limited due to pain. Current FOTO outcome measure score is 49, compared to 45 at IE. They would benefit from continued course of PT to meet all established goals and facilitate return to PLOF.       Impairments: abnormal muscle tone, abnormal or restricted ROM, impaired physical strength, lacks appropriate home exercise program, pain with function and scapular dyskinesis    Symptom irritability: moderateUnderstanding of Dx/Px/POC: good   Prognosis: good    Goals  Short Term Functional Goals:   In 4 weeks patient will:   Decrease R shoulder pain to 4 on a scale of 0-10 to allow turning head and performing ADL. - Not Met  Increase AROM of R shoulder 50% improved symmetry compared to other side for performance of reach/carry tasks. - Not Met  Patient will demonstrate 50% independence and compliance with HEP to maximize improvements in functional mobility. - Met  Patient will demonstrate independence with proper posture, body mechanics, self pain management,  and ADL modification. - Not Met  pt will increase FOTO score by 10pts to reflect a statistically significant improvement.  - Not Met     Long Term Functional Goals (Goals for patient at discharge):    In 6 weeks patient will:   Decrease R shoulder pain to 2 on a scale of 0-10 to allow RTW s limitations. - Not Met  Increase AROM of R shoulder to symmetrical c other shoulder for performance of reach/carry tasks. - Not Met  pt will score at or above predicted discharge FOTO score of 56 to reflect improvement in subjective functional ability. - Not Met          Plan  Patient would benefit from: skilled physical therapy  Referral necessary: No  Planned modality interventions: cryotherapy and thermotherapy: hydrocollator packs  Planned therapy interventions: manual therapy, neuromuscular re-education, patient education, therapeutic activities, therapeutic exercise and home exercise program  Frequency: 2x week  Duration in weeks: 6  Plan of Care beginning date: 3/7/2024  Plan of Care expiration date: 2024  Treatment plan discussed with: patient      Subjective Evaluation    History of Present Illness  Date of onset: 3/6/2023  Mechanism of injury:  Pt returns following 6wk lapse in care due to finances and anxiety of getting medical bills. She was assured that a payment option could be worked out due to ongoing litigation. CORONEL symp L>R occure daily and radiate to temporal area. Causes difficulty sleeping. R Shoulder pain persists and going down to L hand causing numbness. (B) UE pain limits lifting and pulling. Will be getting R shoulder PRP injection 3/19/24. Been noticing more spasm across Ant R shoulder and sharp pain along medial boarder of scapula (B).            Not a recurrent problem   Quality of life: good    Patient Goals  Patient goals for therapy: decreased pain, increased motion, independence with ADLs/IADLs, increased strength and return to work    Pain  Current pain ratin  At best pain rating: 3  At  worst pain ratin  Quality: dull ache, radiating, tight and sharp  Relieving factors: ice and medications  Aggravating factors: overhead activity and lifting  Progression: no change    Social Support    Employment status: working (modified duty)  Hand dominance: right    Treatments  Previous treatment: injection treatment      Objective     Palpation   Left   Muscle spasm in the suboccipitals.   Trigger point to suboccipitals.     Right   Muscle spasm in the pectoralis minor and suboccipitals.   Tenderness of the upper trapezius.   Trigger point to suboccipitals.     Tenderness     Left Shoulder   Tenderness in the medial scapula.     Right Shoulder  Tenderness in the biceps tendon (proximal), medial scapula and supraspinatus tendon.     Active Range of Motion   Cervical/Thoracic Spine       Cervical    Flexion: 50 degrees   Extension: 45 degrees     with pain  Left lateral flexion: 30 degrees      Right lateral flexion: 30 degrees      Left rotation: 55 degrees with pain  Right rotation: 55 degrees       Thoracic    Flexion:  Restriction level: minimal  Extension:  Restriction level: maximal  Left rotation:  Restriction level: moderate  Right rotation:  Restriction level: moderate  Left Shoulder   Flexion: 165 degrees   Abduction: 165 degrees   External rotation BTH: T4   Internal rotation BTB: T8     Right Shoulder   Flexion: 135 degrees with pain  Abduction: 120 degrees with pain  External rotation BTH: T2   Internal rotation BTB: T12     Additional Active Range of Motion Details  SO Rot - L: 15*  R: 20*    Passive Range of Motion     Right Shoulder   Flexion: 150 degrees   Abduction: 115 degrees   External rotation 45°: 85 degrees   External rotation 90°: 80 degrees   Internal rotation 45°: 55 degrees   Internal rotation 90°: 30 degrees     Joint Play     Hypomobile: C1, C2, T3, T4, T5, T6, T7 and T8     Pain: C1, C2, T3, T4, T5, T6, T7 and T8     Strength/Myotome Testing     Left Shoulder   Normal muscle  "strength    Right Shoulder     Planes of Motion   Flexion: 4-   Abduction: 3+   External rotation at 90°: 3+   Internal rotation at 90°: 3+     Tests     Right Shoulder   Positive horn blower, painful arc and bicep load .                Precautions: none     Daily Treatment Diary:      Initial Evaluation Date: 09/21/23  Compliance 11/30 12/5 12/12 12/14 12/21  1/3  1/5  1/11  1/23  3/7   Visit Number 11 12 13 14 15  16  17  18  19  20   Re-Eval                     y   Foto Captured                    y               11/30 12/5 12/12 12/14 12/21  1/3  1/5  1/11  1/23  3/7   Manual                       Scap glide/distraction            4'        5'   [place], levator STM   8' 8' 8' 8'  8'  8'  8'  8'  8'                           Ther-Ex                       Levator/Infraspinatus/Pec minor tack and stretch 8' 8' 8' 8' 8'  8'  Lacrosse ball (pec)   4'  Lacrosse ball (pec)  4'  Lacrosse ball (pec)  4'  Lacrosse ball (pec)                             Thoracic Ext  3x10 3x10 3x10 3x10 3x10  3x10  3x10  3x10  3x10  3x10   Thoracic ROT  x15 (B)          x15 (B)  x15 (B)  x15 (B)  x15 (B)  x15 (B)                                                   Corner Pec stretch   6x20\" 6x20\" Supine  4' Supine  4' Supine  4'  Supine  4'  Supine  4'  Supine  4'  Supine  4'     Standing (B) OH reach c step 2x20  NR                   IR stretch c strap  4x30\" 4x30\" 4x30\" 4x30\" 4x30\"  4x30\"  4x30\"  4x30\"  4x30\"     Shoulder flexion c cane  Sup Full Arc  2# 2x20  Sup Full Arc  2# 2x20 Sup Full Arc  2# 2x20 Sup Full Arc  2# 2x20 Sup Full Arc  2# 2x20  NT           Banded Row  10#  3x15 10#  3x15 10#  3x15 10#  3x15 10#  3x15  10#  3x15  10#  3x15  10#  3x15 10#  3x15      Scap  2# 3x10 5\" ecc 2# 3x10 5\" ecc 2# 3x10 5\" ecc 2# 3x10 5\" ecc 2# 3x10 5\" ecc  2# 3x10 5\" ecc  2# 3x10 5\" ecc  2# 3x10 5\" ecc 2# 3x10 5\" ecc                              Edu                     5'   Neuro Re-Ed                       (B) Banded ER  Grn 2x10 5\" Grn 2x10 " "5\" Grn 2x10 5\" Grn 2x10 5\" Grn 2x10 5\"  Grn 2x10 5\"  Grn 2x10 5\"  Grn 2x10 5\"  Grn 2x10 5\"  Grn 2x10 5\"   Prone Scap Ret  10x10\" 10x10\" 10x10\" 10x10\" 10x10\"  10x10\"  10x10\"  10x10\"  10x10\"  10x10\"   PNF D2 MRE  3x10 3x10 3x10 3x10 3x10  NT            Ball on wall CW/CCW  2x20 ea 2x20 ea 2x20 ea 2x20 ea 1kg   2x20 ea  1kg   2x20 ea  1kg   2x20 ea  1kg   2x20 ea  1kg   2x20 ea      Standing (B) OH reach c step   2x20  2x20  2x20  2x20   2x20   2x20   2x20   2x20   2x20    Ther-Act                                                                                                Modalities                                                                                                    "

## 2024-03-11 ENCOUNTER — OFFICE VISIT (OUTPATIENT)
Age: 44
End: 2024-03-11
Payer: OTHER MISCELLANEOUS

## 2024-03-11 DIAGNOSIS — S46.911D STRAIN OF RIGHT SHOULDER, SUBSEQUENT ENCOUNTER: ICD-10-CM

## 2024-03-11 DIAGNOSIS — M67.819 TENDINOSIS OF ROTATOR CUFF: ICD-10-CM

## 2024-03-11 DIAGNOSIS — G89.29 CHRONIC RIGHT SHOULDER PAIN: Primary | ICD-10-CM

## 2024-03-11 DIAGNOSIS — M54.2 CERVICALGIA: ICD-10-CM

## 2024-03-11 DIAGNOSIS — M25.511 CHRONIC RIGHT SHOULDER PAIN: Primary | ICD-10-CM

## 2024-03-11 PROCEDURE — 97110 THERAPEUTIC EXERCISES: CPT | Performed by: PHYSICAL THERAPIST

## 2024-03-11 PROCEDURE — 97140 MANUAL THERAPY 1/> REGIONS: CPT | Performed by: PHYSICAL THERAPIST

## 2024-03-11 PROCEDURE — 97112 NEUROMUSCULAR REEDUCATION: CPT | Performed by: PHYSICAL THERAPIST

## 2024-03-12 NOTE — PROGRESS NOTES
"Daily Note     Today's date: 3/12/2024  Patient name: Sukhwinder Coe  : 1980  MRN: [id number]  Referring provider: Theo Gar MD  Dx:   Encounter Diagnosis     ICD-10-CM    1. Chronic right shoulder pain  M25.511     G89.29       2. Tendinosis of rotator cuff  M67.819       3. Strain of right shoulder, subsequent encounter  S46.911D       4. Cervicalgia  M54.2           Start Time: 815  Stop Time: 905  Total time in clinic (min): 50 minutes    Subjective: Pt reports no sig change since RA last session. Notes soreness and spasm along (B) scapulae. Pain reported as 7/10 at start of session.         Objective: See treatment diary below        Assessment: pt demonstrates good initial response to POC. Trial of RPW for mm spasm of levator scapula at superior angle (B). Will gauge response next session. Pt remains guarded and c notable mm spasm contributing to HA and shoulder pain. Pt would benefit from continued skilled PT to resolve remaining functional impairments and facilitate return to PLOF.       Plan: Continue per plan of care.  Progress treatment as tolerated.         Precautions: none     Daily Treatment Diary:      Initial Evaluation Date: 23  Compliance 3/11            Visit Number 21            Re-Eval               Foto Captured                          3/11   Manual     Scap glide/distraction  5'   [place], levator STM  8'    EPAT Levator (B)  1 Bar  13Hz   2000 pulses   Ther-Ex     Levator/Infraspinatus/Pec minor tack and stretch           Thoracic Ext  3x10   Thoracic ROT  x15 (B)               Corner Pec stretch   Supine  4'   Standing (B) OH reach c step     IR stretch c strap  4x30\"   Shoulder flexion c cane     Banded Row  10#  3x15    Scap  2# 3x10 5\" ecc          Edu   5'   Neuro Re-Ed     (B) Banded ER  Grn 2x10 5\"   Prone Scap Ret  10x10\"   PNF D2 MRE      Ball on wall CW/CCW      Standing (B) OH reach c step  2x20    Ther-Act                        Modalities               "

## 2024-03-20 ENCOUNTER — OFFICE VISIT (OUTPATIENT)
Age: 44
End: 2024-03-20
Payer: OTHER MISCELLANEOUS

## 2024-03-20 DIAGNOSIS — M75.21 BICIPITAL TENDINITIS OF RIGHT SHOULDER: ICD-10-CM

## 2024-03-20 DIAGNOSIS — S46.911D STRAIN OF RIGHT SHOULDER, SUBSEQUENT ENCOUNTER: ICD-10-CM

## 2024-03-20 DIAGNOSIS — G89.29 CHRONIC RIGHT SHOULDER PAIN: Primary | ICD-10-CM

## 2024-03-20 DIAGNOSIS — M25.511 CHRONIC RIGHT SHOULDER PAIN: Primary | ICD-10-CM

## 2024-03-20 DIAGNOSIS — M67.819 TENDINOSIS OF ROTATOR CUFF: ICD-10-CM

## 2024-03-20 PROCEDURE — 97110 THERAPEUTIC EXERCISES: CPT | Performed by: PHYSICAL THERAPIST

## 2024-03-20 PROCEDURE — 97140 MANUAL THERAPY 1/> REGIONS: CPT | Performed by: PHYSICAL THERAPIST

## 2024-03-20 PROCEDURE — 97112 NEUROMUSCULAR REEDUCATION: CPT | Performed by: PHYSICAL THERAPIST

## 2024-03-20 NOTE — PROGRESS NOTES
Daily Note     Today's date: 3/20/2024  Patient name: Sukhwinder Coe  : 1980  MRN: [id number]  Referring provider: Theo Gar MD  Dx:   Encounter Diagnosis     ICD-10-CM    1. Chronic right shoulder pain  M25.511     G89.29       2. Tendinosis of rotator cuff  M67.819       3. Strain of right shoulder, subsequent encounter  S46.911D       4. Bicipital tendinitis of right shoulder  M75.21             Start Time: 815  Stop Time: 910  Total time in clinic (min): 55 minutes    Subjective: Pt reports having her PRP injection 3/15/24 in R shoulder s benefit to this point. HA 2/10 today. Back and R shoulder pain reported as 7/10 at start of session.         Objective: See treatment diary below        Assessment: pt continues to be seen via DA and was again encouraged to get scheduled c a new PCP. RPW applied today at R coracoacromial lig due to focal tenderness. Pt edu on expected outcome and side-effects. Will gauge response next session. Pt remains guarded and c notable mm spasm contributing to HA and shoulder pain. Pt would benefit from continued skilled PT to resolve remaining functional impairments and facilitate return to PLOF.       Plan: Continue per plan of care.  Progress treatment as tolerated.         Precautions: none     Daily Treatment Diary:      Initial Evaluation Date: 23  Compliance 3/11 3/20           Visit Number 21 22           Re-Eval               Foto Captured                          3/11 3/20           Manual                           Scap glide/distraction  5' Scap glides 4'  Thoracic PA Gr3 4'           [place], levator STM  8' 8'            EPAT Levator (B)  1 Bar  13Hz   2000 pulses R Coracoacromial lig  0.8 Bar  13Hz   2000 pulses           Ther-Ex              Levator/Infraspinatus/Pec minor tack and stretch                             Thoracic Ext  3x10            Thoracic ROT  x15 (B)                                          Corner Pec stretch   Supine  4'  "Supine  4'           Standing (B) OH reach c step              IR stretch c strap  4x30\" 4x30\"           Shoulder flexion c cane              Banded Row  10#  3x15  10#  3x15            Scap  2# 3x10 5\" ecc  2# 3x10 5\" ecc                           Edu   5'            Neuro Re-Ed              (B) Banded ER  Grn 2x10 5\" Grn 2x10 5\"           Prone Scap Ret  10x10\" 10x10\"           PNF D2 MRE               Ball on wall CW/CCW               Standing (B) OH reach c step  2x20  2x20            Ther-Act                                                            Modalities                                                                  "

## 2024-04-03 ENCOUNTER — OFFICE VISIT (OUTPATIENT)
Age: 44
End: 2024-04-03
Payer: OTHER MISCELLANEOUS

## 2024-04-03 DIAGNOSIS — M67.819 TENDINOSIS OF ROTATOR CUFF: ICD-10-CM

## 2024-04-03 DIAGNOSIS — M75.21 BICIPITAL TENDINITIS OF RIGHT SHOULDER: ICD-10-CM

## 2024-04-03 DIAGNOSIS — S46.911D STRAIN OF RIGHT SHOULDER, SUBSEQUENT ENCOUNTER: ICD-10-CM

## 2024-04-03 DIAGNOSIS — G89.29 CHRONIC RIGHT SHOULDER PAIN: Primary | ICD-10-CM

## 2024-04-03 DIAGNOSIS — M54.2 CERVICALGIA: ICD-10-CM

## 2024-04-03 DIAGNOSIS — M25.511 CHRONIC RIGHT SHOULDER PAIN: Primary | ICD-10-CM

## 2024-04-03 PROCEDURE — 97110 THERAPEUTIC EXERCISES: CPT | Performed by: PHYSICAL THERAPIST

## 2024-04-03 PROCEDURE — 97112 NEUROMUSCULAR REEDUCATION: CPT | Performed by: PHYSICAL THERAPIST

## 2024-04-03 PROCEDURE — 97140 MANUAL THERAPY 1/> REGIONS: CPT | Performed by: PHYSICAL THERAPIST

## 2024-04-03 NOTE — LETTER
2024    Bob Meehan, DO  [address] [facility] [address]  [facility] [address]    Patient: Sukhwinder Coe   YOB: 1980   Date of Visit: 4/3/2024     Encounter Diagnosis     ICD-10-CM    1. Chronic right shoulder pain  M25.511     G89.29       2. Cervicalgia  M54.2       3. Tendinosis of rotator cuff  M67.819       4. Strain of right shoulder, subsequent encounter  S46.911D       5. Bicipital tendinitis of right shoulder  M75.21           Dear Dr. Meehan:    Thank you for your recent referral of Sukhwinder Coe. Please review the attached evaluation summary from Sukhwinder's recent visit.     Please verify that you agree with the plan of care by signing the attached order.     If you have any questions or concerns, please do not hesitate to call.     I sincerely appreciate the opportunity to share in the care of one of your patients and hope to have another opportunity to work with you in the near future.       Sincerely,    Олег Oglesby, PT      Referring Provider:      I certify that I have read the below Plan of Care and certify the need for these services furnished under this plan of treatment while under my care.                    Bob Meehan, DO   [facility] [address]  [facility] [address]  Via Fax: [phone]          PT Re-Evaluation     Today's date: 4/3/2024  Patient name: Sukhwinder Coe  : 1980  MRN: [id number]  Referring provider: Theo Gar MD  Dx:   Encounter Diagnosis     ICD-10-CM    1. Chronic right shoulder pain  M25.511     G89.29       2. Cervicalgia  M54.2       3. Tendinosis of rotator cuff  M67.819       4. Strain of right shoulder, subsequent encounter  S46.911D       5. Bicipital tendinitis of right shoulder  M75.21                          Assessment  Assessment details: Sukhwinder Coe has been seen for 23 sessions of PT related to R shoulder and neck pain following a work injury. She returned to PT today following a 10 day lapse in care. Following  exam, she remains limited with: sleeping, push/pull tasks, and carrying objects that would be required by her job. mm spasm of upper Cx and parascapular mm have improved since last progress note . Both ROM and MMT limited due to pain. Current FOTO not completed today due to pt forgetting prior to leaving. They would benefit from continued course of PT to meet all established goals and facilitate return to PLOF.       Impairments: abnormal muscle tone, abnormal or restricted ROM, impaired physical strength, lacks appropriate home exercise program, pain with function and scapular dyskinesis    Symptom irritability: moderateUnderstanding of Dx/Px/POC: good   Prognosis: good    Goals  Short Term Functional Goals:   In 4 weeks patient will:   Decrease R shoulder pain to 4 on a scale of 0-10 to allow turning head and performing ADL. - Not Met  Increase AROM of R shoulder 50% improved symmetry compared to other side for performance of reach/carry tasks. - Not Met  Patient will demonstrate 50% independence and compliance with HEP to maximize improvements in functional mobility. - Met  Patient will demonstrate independence with proper posture, body mechanics, self pain management, and ADL modification. - Not Met  pt will increase FOTO score by 10pts to reflect a statistically significant improvement.  - Not Met     Long Term Functional Goals (Goals for patient at discharge):    In 6 weeks patient will:   Decrease R shoulder pain to 2 on a scale of 0-10 to allow RTW s limitations. - Not Met  Increase AROM of R shoulder to symmetrical c other shoulder for performance of reach/carry tasks. - Not Met  pt will score at or above predicted discharge FOTO score of 56 to reflect improvement in subjective functional ability. - Not Met          Plan  Patient would benefit from: skilled physical therapy  Referral necessary: No  Planned modality interventions: cryotherapy and thermotherapy: hydrocollator packs  Planned therapy  interventions: manual therapy, neuromuscular re-education, patient education, therapeutic activities, therapeutic exercise and home exercise program  Frequency: 2x week  Duration in weeks: 6  Plan of Care beginning date: 4/3/2024  Plan of Care expiration date: 5/15/2024  Treatment plan discussed with: patient      Subjective Evaluation    History of Present Illness  Date of onset: 3/6/2023  Mechanism of injury: Pt sick last 10 days and unable to attend PT. Since last progress note HA symp have decreased in intensity and freq, now 3-4x/wk. R Shoulder pain persists and limits reaching OH. (B) UE pain limits lifting and pulling. Reports only mild improvement since R shoulder PRP injection 3/19/24. Has follow-up with same physician 24.             Not a recurrent problem   Quality of life: good    Patient Goals  Patient goals for therapy: decreased pain, increased motion, independence with ADLs/IADLs, increased strength and return to work    Pain  Current pain rating: 3  At best pain rating: 3  At worst pain ratin  Quality: dull ache, radiating, tight and sharp  Relieving factors: ice and medications  Aggravating factors: overhead activity and lifting  Progression: no change    Social Support    Employment status: working (modified duty)  Hand dominance: right    Treatments  Previous treatment: injection treatment      Objective     Palpation   Left   Hypertonic in the scalenes.   Muscle spasm in the suboccipitals.   Trigger point to suboccipitals.     Right   Hypertonic in the scalenes.   Muscle spasm in the pectoralis minor.   Tenderness of the upper trapezius.     Tenderness     Left Shoulder   Tenderness in the medial scapula.     Right Shoulder  Tenderness in the biceps tendon (proximal), medial scapula and supraspinatus tendon.     Active Range of Motion   Cervical/Thoracic Spine       Cervical    Flexion: 50 degrees   Extension: 50 degrees     with pain  Left lateral flexion: 30 degrees      Right  lateral flexion: 30 degrees      Left rotation: 55 degrees with pain  Right rotation: 55 degrees       Thoracic    Flexion:  Restriction level: minimal  Extension:  Restriction level: maximal  Left rotation:  Restriction level: moderate  Right rotation:  Restriction level: moderate  Left Shoulder   Flexion: 165 degrees   Abduction: 165 degrees   External rotation BTH: T5   Internal rotation BTB: T8     Right Shoulder   Flexion: 155 degrees with pain  Abduction: 135 degrees with pain  External rotation BTH: T3   Internal rotation BTB: T11     Additional Active Range of Motion Details  SO Rot - (B) 25*    Passive Range of Motion     Right Shoulder   Flexion: 155 degrees   Abduction: 140 degrees   External rotation 45°: 85 degrees   External rotation 90°: 80 degrees   Internal rotation 45°: 55 degrees   Internal rotation 90°: 30 degrees     Joint Play     Hypomobile: C2, T3, T4, T5, T6, T7 and T8     Pain: C2, T3, T4, T5, T6, T7 and T8     Strength/Myotome Testing     Left Shoulder   Normal muscle strength    Right Shoulder     Planes of Motion   Flexion: 4-   Abduction: 3+   External rotation at 90°: 3+   Internal rotation at 90°: 3+     Tests     Right Shoulder   Positive horn blower, painful arc and bicep load .                Precautions: none     Daily Treatment Diary:      Initial Evaluation Date: 09/21/23  Compliance 3/11 3/20  4/3                 Visit Number 21 22  23                 Re-Eval       Y                 Foto Captured                                   3/11 3/20  4/3                 Manual                                               Scap glide/distraction  5' Scap glides 4'  Thoracic PA Gr3 4'  Scap glides 4'  Thoracic PA Gr3 4                 [place], levator STM  8' 8'  8'                  EPAT Levator (B)  1 Bar  13Hz   2000 pulses R Coracoacromial lig  0.8 Bar  13Hz   2000 pulses                   Ther-Ex                       Levator/Infraspinatus/Pec minor tack and stretch                  "                              Thoracic Ext  3x10    3x10                 Thoracic ROT  x15 (B)    x15 (B)                                                                 Corner Pec stretch   Supine  4' Supine  4'  Supine  4'                 Standing (B) OH reach c step                       IR stretch c strap  4x30\" 4x30\"                   Shoulder flexion c cane                       Banded Row  10#  3x15  10#  3x15   10#  3x15                  Scap  2# 3x10 5\" ecc  2# 3x10 5\" ecc   2# 3x10 5\" ecc                                          Edu   5'                     Neuro Re-Ed                       (B) Banded ER  Grn 2x10 5\" Grn 2x10 5\"  Grn 2x10 5\"                 Prone Scap Ret  10x10\" 10x10\" 10x10\"                 PNF D2 MRE                        Ball on wall CW/CCW                        Standing (B) OH reach c step  2x20  2x20   2x20                  Ther-Act                                                                                                Modalities                                                                                                                    "

## 2024-04-03 NOTE — PROGRESS NOTES
PT Re-Evaluation     Today's date: 4/3/2024  Patient name: Sukhwinder Coe  : 1980  MRN: [id number]  Referring provider: Theo Gar MD  Dx:   Encounter Diagnosis     ICD-10-CM    1. Chronic right shoulder pain  M25.511     G89.29       2. Cervicalgia  M54.2       3. Tendinosis of rotator cuff  M67.819       4. Strain of right shoulder, subsequent encounter  S46.911D       5. Bicipital tendinitis of right shoulder  M75.21                          Assessment  Assessment details: Sukhwinder Coe has been seen for 23 sessions of PT related to R shoulder and neck pain following a work injury. She returned to PT today following a 10 day lapse in care. Following exam, she remains limited with: sleeping, push/pull tasks, and carrying objects that would be required by her job. mm spasm of upper Cx and parascapular mm have improved since last progress note . Both ROM and MMT limited due to pain. Current FOTO not completed today due to pt forgetting prior to leaving. They would benefit from continued course of PT to meet all established goals and facilitate return to PLOF.       Impairments: abnormal muscle tone, abnormal or restricted ROM, impaired physical strength, lacks appropriate home exercise program, pain with function and scapular dyskinesis    Symptom irritability: moderateUnderstanding of Dx/Px/POC: good   Prognosis: good    Goals  Short Term Functional Goals:   In 4 weeks patient will:   Decrease R shoulder pain to 4 on a scale of 0-10 to allow turning head and performing ADL. - Not Met  Increase AROM of R shoulder 50% improved symmetry compared to other side for performance of reach/carry tasks. - Not Met  Patient will demonstrate 50% independence and compliance with HEP to maximize improvements in functional mobility. - Met  Patient will demonstrate independence with proper posture, body mechanics, self pain management, and ADL modification. - Not Met  pt will increase FOTO score by 10pts to reflect a  statistically significant improvement.  - Not Met     Long Term Functional Goals (Goals for patient at discharge):    In 6 weeks patient will:   Decrease R shoulder pain to 2 on a scale of 0-10 to allow RTW s limitations. - Not Met  Increase AROM of R shoulder to symmetrical c other shoulder for performance of reach/carry tasks. - Not Met  pt will score at or above predicted discharge FOTO score of 56 to reflect improvement in subjective functional ability. - Not Met          Plan  Patient would benefit from: skilled physical therapy  Referral necessary: No  Planned modality interventions: cryotherapy and thermotherapy: hydrocollator packs  Planned therapy interventions: manual therapy, neuromuscular re-education, patient education, therapeutic activities, therapeutic exercise and home exercise program  Frequency: 2x week  Duration in weeks: 6  Plan of Care beginning date: 4/3/2024  Plan of Care expiration date: 5/15/2024  Treatment plan discussed with: patient      Subjective Evaluation    History of Present Illness  Date of onset: 3/6/2023  Mechanism of injury: Pt sick last 10 days and unable to attend PT. Since last progress note HA symp have decreased in intensity and freq, now 3-4x/wk. R Shoulder pain persists and limits reaching OH. (B) UE pain limits lifting and pulling. Reports only mild improvement since R shoulder PRP injection 3/19/24. Has follow-up with same physician 24.             Not a recurrent problem   Quality of life: good    Patient Goals  Patient goals for therapy: decreased pain, increased motion, independence with ADLs/IADLs, increased strength and return to work    Pain  Current pain rating: 3  At best pain rating: 3  At worst pain ratin  Quality: dull ache, radiating, tight and sharp  Relieving factors: ice and medications  Aggravating factors: overhead activity and lifting  Progression: no change    Social Support    Employment status: working (modified duty)  Hand dominance:  right    Treatments  Previous treatment: injection treatment      Objective     Palpation   Left   Hypertonic in the scalenes.   Muscle spasm in the suboccipitals.   Trigger point to suboccipitals.     Right   Hypertonic in the scalenes.   Muscle spasm in the pectoralis minor.   Tenderness of the upper trapezius.     Tenderness     Left Shoulder   Tenderness in the medial scapula.     Right Shoulder  Tenderness in the biceps tendon (proximal), medial scapula and supraspinatus tendon.     Active Range of Motion   Cervical/Thoracic Spine       Cervical    Flexion: 50 degrees   Extension: 50 degrees     with pain  Left lateral flexion: 30 degrees      Right lateral flexion: 30 degrees      Left rotation: 55 degrees with pain  Right rotation: 55 degrees       Thoracic    Flexion:  Restriction level: minimal  Extension:  Restriction level: maximal  Left rotation:  Restriction level: moderate  Right rotation:  Restriction level: moderate  Left Shoulder   Flexion: 165 degrees   Abduction: 165 degrees   External rotation BTH: T5   Internal rotation BTB: T8     Right Shoulder   Flexion: 155 degrees with pain  Abduction: 135 degrees with pain  External rotation BTH: T3   Internal rotation BTB: T11     Additional Active Range of Motion Details  SO Rot - (B) 25*    Passive Range of Motion     Right Shoulder   Flexion: 155 degrees   Abduction: 140 degrees   External rotation 45°: 85 degrees   External rotation 90°: 80 degrees   Internal rotation 45°: 55 degrees   Internal rotation 90°: 30 degrees     Joint Play     Hypomobile: C2, T3, T4, T5, T6, T7 and T8     Pain: C2, T3, T4, T5, T6, T7 and T8     Strength/Myotome Testing     Left Shoulder   Normal muscle strength    Right Shoulder     Planes of Motion   Flexion: 4-   Abduction: 3+   External rotation at 90°: 3+   Internal rotation at 90°: 3+     Tests     Right Shoulder   Positive horn blower, painful arc and bicep load .                Precautions: none     Daily Treatment  "Diary:      Initial Evaluation Date: 09/21/23  Compliance 3/11 3/20  4/3                 Visit Number 21 22  23                 Re-Eval       Y                 Foto Captured                                   3/11 3/20  4/3                 Manual                                               Scap glide/distraction  5' Scap glides 4'  Thoracic PA Gr3 4'  Scap glides 4'  Thoracic PA Gr3 4                 [place], levator STM  8' 8'  8'                  EPAT Levator (B)  1 Bar  13Hz   2000 pulses R Coracoacromial lig  0.8 Bar  13Hz   2000 pulses                   Ther-Ex                       Levator/Infraspinatus/Pec minor tack and stretch                                               Thoracic Ext  3x10    3x10                 Thoracic ROT  x15 (B)    x15 (B)                                                                 Corner Pec stretch   Supine  4' Supine  4'  Supine  4'                 Standing (B) OH reach c step                       IR stretch c strap  4x30\" 4x30\"                   Shoulder flexion c cane                       Banded Row  10#  3x15  10#  3x15   10#  3x15                  Scap  2# 3x10 5\" ecc  2# 3x10 5\" ecc   2# 3x10 5\" ecc                                          Edu   5'                     Neuro Re-Ed                       (B) Banded ER  Grn 2x10 5\" Grn 2x10 5\"  Grn 2x10 5\"                 Prone Scap Ret  10x10\" 10x10\" 10x10\"                 PNF D2 MRE                        Ball on wall CW/CCW                        Standing (B) OH reach c step  2x20  2x20   2x20                  Ther-Act                                                                                                Modalities                                                                                                    "

## 2024-04-09 ENCOUNTER — OFFICE VISIT (OUTPATIENT)
Age: 44
End: 2024-04-09
Payer: OTHER MISCELLANEOUS

## 2024-04-09 DIAGNOSIS — G89.29 CHRONIC RIGHT SHOULDER PAIN: Primary | ICD-10-CM

## 2024-04-09 DIAGNOSIS — M54.2 CERVICALGIA: ICD-10-CM

## 2024-04-09 DIAGNOSIS — M75.21 BICIPITAL TENDINITIS OF RIGHT SHOULDER: ICD-10-CM

## 2024-04-09 DIAGNOSIS — M67.819 TENDINOSIS OF ROTATOR CUFF: ICD-10-CM

## 2024-04-09 DIAGNOSIS — S46.911D STRAIN OF RIGHT SHOULDER, SUBSEQUENT ENCOUNTER: ICD-10-CM

## 2024-04-09 DIAGNOSIS — M25.511 CHRONIC RIGHT SHOULDER PAIN: Primary | ICD-10-CM

## 2024-04-09 PROCEDURE — 97110 THERAPEUTIC EXERCISES: CPT | Performed by: PHYSICAL THERAPIST

## 2024-04-09 PROCEDURE — 97140 MANUAL THERAPY 1/> REGIONS: CPT | Performed by: PHYSICAL THERAPIST

## 2024-04-09 PROCEDURE — 97112 NEUROMUSCULAR REEDUCATION: CPT | Performed by: PHYSICAL THERAPIST

## 2024-04-09 NOTE — PROGRESS NOTES
Daily Note     Today's date: 2024  Patient name: Sukhiwnder Coe  : 1980  MRN: [id number]  Referring provider: Bob Meehan DO  Dx:   Encounter Diagnosis     ICD-10-CM    1. Chronic right shoulder pain  M25.511     G89.29       2. Cervicalgia  M54.2       3. Tendinosis of rotator cuff  M67.819       4. Bicipital tendinitis of right shoulder  M75.21       5. Strain of right shoulder, subsequent encounter  S46.911D           Start Time: 815  Stop Time: 910  Total time in clinic (min): 55 minutes    Subjective: Pt reports no further relief from injection since last session. Notes throbbing in Ant R shoulder today. Pain reported as 4/10 at start of session.         Objective: See treatment diary below.         Assessment:  Sukhwinder Coe was progressed with PT interventions, focusing on appropriate technique and intensity. Utilizing alternate technique for PROM pt was able to better relax and GH mobility improved. They remain limited with reach/carry ability and sleeping. Pt would benefit from continued skilled PT to resolve remaining functional impairments and facilitate return to PLOF.       Plan: Continue per plan of care.  Progress treatment as tolerated.         Precautions: none     Daily Treatment Diary:      Initial Evaluation Date: 23  Compliance 3/11 3/20  4/3  4/9               Visit Number 21 22  23  24               Re-Eval       Y                 Foto Captured                                   3/11 3/20  4/3  4/9               Manual                                               Scap glide/distraction  5' Scap glides 4'  Thoracic PA Gr3 4'  Scap glides 4'  Thoracic PA Gr3 4  Scap glides 4'  Thoracic PA Gr3 4               [place], [place], levator STM  8' 8'  8'  8'                EPAT Levator (B)  1 Bar  13Hz   2000 pulses R Coracoacromial lig  0.8 Bar  13Hz   2000 pulses                   Ther-Ex                       Levator/Infraspinatus/Pec minor tack and stretch                            "                    Thoracic Ext  3x10    3x10  3x10               Thoracic ROT  x15 (B)    x15 (B)  x15 (B)                                                               Corner Pec stretch   Supine  4' Supine  4'  Supine  4'  Supine  4'               Standing (B) OH reach c step                       IR stretch c strap  4x30\" 4x30\"                   Rev [place] CW/CCW    2# 2x20 ea         Sup Shoulder flexion         2# 2x20                Banded Row  10#  3x15  10#  3x15   10#  3x15   10#  3x15                Scap  2# 3x10 5\" ecc  2# 3x10 5\" ecc   2# 3x10 5\" ecc   2# 3x10 5\" ecc                                        Edu   5'                     Neuro Re-Ed                         (B) Banded ER  Grn 2x10 5\" Grn 2x10 5\"  Grn 2x10 5\"  Grn 2x10 5\"               Prone Scap Ret  10x10\" 10x10\" 10x10\"  10x10\"               PNF D2 MRE                                                Standing (B) OH reach c step  2x20  2x20   2x20   2x20                Ther-Act                                                                                                Modalities                                                                                                        "

## (undated) DEVICE — CHLORAPREP HI-LITE 10.5ML ORANGE

## (undated) DEVICE — SYRINGE LOR 8ML PLASTIC LL

## (undated) DEVICE — SYRINGE 5ML LL

## (undated) DEVICE — IV EXTENSION TUBING SMALL BORE

## (undated) DEVICE — PLASTIC ADHESIVE BANDAGE: Brand: CURITY

## (undated) DEVICE — GLOVE INDICATOR PI UNDERGLOVE SZ 7.5 BLUE

## (undated) DEVICE — GAUZE SPONGES,USP TYPE VII GAUZE, 12 PLY: Brand: CURITY

## (undated) DEVICE — DRAPE SHEET THREE QUARTER

## (undated) DEVICE — TRAY EPID CONT PERIFIX 18G X 3.5IN 10ML CLSD TIP

## (undated) DEVICE — SYRINGE 10ML LL

## (undated) DEVICE — NEEDLE BLUNT 18 G X 1 1/2IN